# Patient Record
Sex: FEMALE | Race: WHITE | NOT HISPANIC OR LATINO | Employment: PART TIME | URBAN - METROPOLITAN AREA
[De-identification: names, ages, dates, MRNs, and addresses within clinical notes are randomized per-mention and may not be internally consistent; named-entity substitution may affect disease eponyms.]

---

## 2018-10-22 ENCOUNTER — APPOINTMENT (EMERGENCY)
Dept: RADIOLOGY | Facility: HOSPITAL | Age: 28
DRG: 223 | End: 2018-10-22
Payer: COMMERCIAL

## 2018-10-22 ENCOUNTER — ANESTHESIA (INPATIENT)
Dept: PERIOP | Facility: HOSPITAL | Age: 28
DRG: 223 | End: 2018-10-22
Payer: COMMERCIAL

## 2018-10-22 ENCOUNTER — ANESTHESIA EVENT (INPATIENT)
Dept: PERIOP | Facility: HOSPITAL | Age: 28
DRG: 223 | End: 2018-10-22
Payer: COMMERCIAL

## 2018-10-22 ENCOUNTER — APPOINTMENT (EMERGENCY)
Dept: CT IMAGING | Facility: HOSPITAL | Age: 28
DRG: 223 | End: 2018-10-22
Payer: COMMERCIAL

## 2018-10-22 ENCOUNTER — HOSPITAL ENCOUNTER (INPATIENT)
Facility: HOSPITAL | Age: 28
LOS: 3 days | Discharge: LEFT BEFORE DISCHARGE | DRG: 223 | End: 2018-10-25
Attending: EMERGENCY MEDICINE | Admitting: SURGERY
Payer: COMMERCIAL

## 2018-10-22 DIAGNOSIS — F19.10 IVDA (INTRAVENOUS DRUG ABUSE) COMPLICATING PREGNANCY (HCC): ICD-10-CM

## 2018-10-22 DIAGNOSIS — K66.8 PNEUMOPERITONEUM: Primary | ICD-10-CM

## 2018-10-22 DIAGNOSIS — F11.10 HEROIN ABUSE (HCC): ICD-10-CM

## 2018-10-22 DIAGNOSIS — O99.320 IVDA (INTRAVENOUS DRUG ABUSE) COMPLICATING PREGNANCY (HCC): ICD-10-CM

## 2018-10-22 PROBLEM — T74.91XA DOMESTIC ABUSE OF ADULT, INITIAL ENCOUNTER: Status: ACTIVE | Noted: 2018-10-22

## 2018-10-22 PROBLEM — K25.1: Status: ACTIVE | Noted: 2018-10-22

## 2018-10-22 PROBLEM — D72.823 LEUKEMOID REACTION: Status: ACTIVE | Noted: 2018-10-22

## 2018-10-22 PROBLEM — E87.1 HYPONATREMIA: Status: ACTIVE | Noted: 2018-10-22

## 2018-10-22 PROBLEM — J69.0 ASPIRATION PNEUMONIA (HCC): Status: ACTIVE | Noted: 2018-10-22

## 2018-10-22 LAB
ABO GROUP BLD: NORMAL
ALBUMIN SERPL BCP-MCNC: 3.9 G/DL (ref 3.5–5.7)
ALP SERPL-CCNC: 70 U/L (ref 40–150)
ALT SERPL W P-5'-P-CCNC: 35 U/L (ref 7–52)
AMPHETAMINES SERPL QL SCN: POSITIVE
ANION GAP SERPL CALCULATED.3IONS-SCNC: 8 MMOL/L (ref 4–13)
AST SERPL W P-5'-P-CCNC: 33 U/L (ref 13–39)
B-HCG SERPL-ACNC: <0.6 MIU/ML (ref 0–11.6)
BARBITURATES UR QL: NEGATIVE
BASOPHILS # BLD AUTO: 0 THOUSANDS/ΜL (ref 0–0.1)
BASOPHILS NFR BLD AUTO: 0 % (ref 0–2)
BENZODIAZ UR QL: NEGATIVE
BILIRUB SERPL-MCNC: 1.1 MG/DL (ref 0.2–1)
BLD GP AB SCN SERPL QL: NEGATIVE
BUN SERPL-MCNC: 12 MG/DL (ref 7–25)
CALCIUM SERPL-MCNC: 9.1 MG/DL (ref 8.6–10.5)
CHLORIDE SERPL-SCNC: 98 MMOL/L (ref 98–107)
CO2 SERPL-SCNC: 25 MMOL/L (ref 21–31)
COCAINE UR QL: NEGATIVE
CREAT SERPL-MCNC: 0.61 MG/DL (ref 0.6–1.2)
EOSINOPHIL # BLD AUTO: 0 THOUSAND/ΜL (ref 0–0.61)
EOSINOPHIL NFR BLD AUTO: 0 % (ref 0–5)
ERYTHROCYTE [DISTWIDTH] IN BLOOD BY AUTOMATED COUNT: 12.8 % (ref 11.5–14.5)
GFR SERPL CREATININE-BSD FRML MDRD: 125 ML/MIN/1.73SQ M
GLUCOSE SERPL-MCNC: 85 MG/DL (ref 65–99)
HCT VFR BLD AUTO: 39.5 % (ref 34.8–46.1)
HGB BLD-MCNC: 14 G/DL (ref 12–16)
LG PLATELETS BLD QL SMEAR: PRESENT
LYMPHOCYTES # BLD AUTO: 1.7 THOUSANDS/ΜL (ref 0.6–4.47)
LYMPHOCYTES NFR BLD AUTO: 14 % (ref 21–51)
MCH RBC QN AUTO: 33.1 PG (ref 26–34)
MCHC RBC AUTO-ENTMCNC: 35.4 G/DL (ref 31–37)
MCV RBC AUTO: 94 FL (ref 81–99)
METHADONE UR QL: NEGATIVE
MONOCYTES # BLD AUTO: 1 THOUSAND/ΜL (ref 0.17–1.22)
MONOCYTES NFR BLD AUTO: 8 % (ref 2–12)
NEUTROPHILS # BLD AUTO: 10 THOUSANDS/ΜL (ref 1.4–6.5)
NEUTS SEG NFR BLD AUTO: 78 % (ref 42–75)
NRBC BLD AUTO-RTO: 0 /100 WBCS
OPIATES UR QL SCN: NEGATIVE
PCP UR QL: NEGATIVE
PLATELET # BLD AUTO: 190 THOUSANDS/UL (ref 149–390)
PLATELET BLD QL SMEAR: ADEQUATE
PMV BLD AUTO: 9.2 FL (ref 8.6–11.7)
POTASSIUM SERPL-SCNC: 3.4 MMOL/L (ref 3.5–5.5)
PROT SERPL-MCNC: 6.9 G/DL (ref 6.4–8.9)
RBC # BLD AUTO: 4.22 MILLION/UL (ref 3.9–5.2)
RBC MORPH BLD: NORMAL
RH BLD: NEGATIVE
SODIUM SERPL-SCNC: 131 MMOL/L (ref 134–143)
SPECIMEN EXPIRATION DATE: NORMAL
THC UR QL: POSITIVE
WBC # BLD AUTO: 12.9 THOUSAND/UL (ref 4.8–10.8)

## 2018-10-22 PROCEDURE — 80053 COMPREHEN METABOLIC PANEL: CPT | Performed by: EMERGENCY MEDICINE

## 2018-10-22 PROCEDURE — C9113 INJ PANTOPRAZOLE SODIUM, VIA: HCPCS | Performed by: ANESTHESIOLOGY

## 2018-10-22 PROCEDURE — 43840 GSTRRPHY SUTR DUOL/GSTR ULCR: CPT | Performed by: PHYSICIAN ASSISTANT

## 2018-10-22 PROCEDURE — 86900 BLOOD TYPING SEROLOGIC ABO: CPT | Performed by: EMERGENCY MEDICINE

## 2018-10-22 PROCEDURE — 36415 COLL VENOUS BLD VENIPUNCTURE: CPT | Performed by: EMERGENCY MEDICINE

## 2018-10-22 PROCEDURE — 0DU947Z SUPPLEMENT DUODENUM WITH AUTOLOGOUS TISSUE SUBSTITUTE, PERCUTANEOUS ENDOSCOPIC APPROACH: ICD-10-PCS | Performed by: SURGERY

## 2018-10-22 PROCEDURE — 99223 1ST HOSP IP/OBS HIGH 75: CPT | Performed by: SURGERY

## 2018-10-22 PROCEDURE — 99285 EMERGENCY DEPT VISIT HI MDM: CPT

## 2018-10-22 PROCEDURE — 74022 RADEX COMPL AQT ABD SERIES: CPT

## 2018-10-22 PROCEDURE — 80307 DRUG TEST PRSMV CHEM ANLYZR: CPT | Performed by: SURGERY

## 2018-10-22 PROCEDURE — 86850 RBC ANTIBODY SCREEN: CPT | Performed by: EMERGENCY MEDICINE

## 2018-10-22 PROCEDURE — 96374 THER/PROPH/DIAG INJ IV PUSH: CPT

## 2018-10-22 PROCEDURE — C9113 INJ PANTOPRAZOLE SODIUM, VIA: HCPCS | Performed by: SURGERY

## 2018-10-22 PROCEDURE — 84702 CHORIONIC GONADOTROPIN TEST: CPT | Performed by: EMERGENCY MEDICINE

## 2018-10-22 PROCEDURE — 43840 GSTRRPHY SUTR DUOL/GSTR ULCR: CPT | Performed by: SURGERY

## 2018-10-22 PROCEDURE — 96372 THER/PROPH/DIAG INJ SC/IM: CPT

## 2018-10-22 PROCEDURE — 86901 BLOOD TYPING SEROLOGIC RH(D): CPT | Performed by: EMERGENCY MEDICINE

## 2018-10-22 PROCEDURE — 99252 IP/OBS CONSLTJ NEW/EST SF 35: CPT | Performed by: NURSE PRACTITIONER

## 2018-10-22 PROCEDURE — 85025 COMPLETE CBC W/AUTO DIFF WBC: CPT | Performed by: EMERGENCY MEDICINE

## 2018-10-22 PROCEDURE — 74176 CT ABD & PELVIS W/O CONTRAST: CPT

## 2018-10-22 RX ORDER — LIDOCAINE HYDROCHLORIDE 10 MG/ML
INJECTION, SOLUTION INFILTRATION; PERINEURAL AS NEEDED
Status: DISCONTINUED | OUTPATIENT
Start: 2018-10-22 | End: 2018-10-22 | Stop reason: SURG

## 2018-10-22 RX ORDER — ONDANSETRON 2 MG/ML
INJECTION INTRAMUSCULAR; INTRAVENOUS AS NEEDED
Status: DISCONTINUED | OUTPATIENT
Start: 2018-10-22 | End: 2018-10-22 | Stop reason: SURG

## 2018-10-22 RX ORDER — SODIUM CHLORIDE, SODIUM LACTATE, POTASSIUM CHLORIDE, CALCIUM CHLORIDE 600; 310; 30; 20 MG/100ML; MG/100ML; MG/100ML; MG/100ML
125 INJECTION, SOLUTION INTRAVENOUS CONTINUOUS
Status: DISCONTINUED | OUTPATIENT
Start: 2018-10-22 | End: 2018-10-23

## 2018-10-22 RX ORDER — ROCURONIUM BROMIDE 10 MG/ML
INJECTION, SOLUTION INTRAVENOUS AS NEEDED
Status: DISCONTINUED | OUTPATIENT
Start: 2018-10-22 | End: 2018-10-22 | Stop reason: SURG

## 2018-10-22 RX ORDER — TRAMADOL HYDROCHLORIDE 50 MG/1
50 TABLET ORAL ONCE
Status: DISCONTINUED | OUTPATIENT
Start: 2018-10-22 | End: 2018-10-22

## 2018-10-22 RX ORDER — MIDAZOLAM HYDROCHLORIDE 1 MG/ML
INJECTION INTRAMUSCULAR; INTRAVENOUS AS NEEDED
Status: DISCONTINUED | OUTPATIENT
Start: 2018-10-22 | End: 2018-10-22 | Stop reason: SURG

## 2018-10-22 RX ORDER — PANTOPRAZOLE SODIUM 40 MG/1
40 INJECTION, POWDER, FOR SOLUTION INTRAVENOUS ONCE
Status: COMPLETED | OUTPATIENT
Start: 2018-10-22 | End: 2018-10-22

## 2018-10-22 RX ORDER — HEPARIN SODIUM 5000 [USP'U]/ML
5000 INJECTION, SOLUTION INTRAVENOUS; SUBCUTANEOUS EVERY 8 HOURS SCHEDULED
Status: DISCONTINUED | OUTPATIENT
Start: 2018-10-22 | End: 2018-10-25 | Stop reason: HOSPADM

## 2018-10-22 RX ORDER — LORAZEPAM 2 MG/ML
1 INJECTION INTRAMUSCULAR ONCE
Status: COMPLETED | OUTPATIENT
Start: 2018-10-22 | End: 2018-10-22

## 2018-10-22 RX ORDER — LORAZEPAM 2 MG/ML
1 INJECTION INTRAMUSCULAR EVERY 4 HOURS PRN
Status: DISCONTINUED | OUTPATIENT
Start: 2018-10-22 | End: 2018-10-25 | Stop reason: HOSPADM

## 2018-10-22 RX ORDER — PROPOFOL 10 MG/ML
INJECTION, EMULSION INTRAVENOUS AS NEEDED
Status: DISCONTINUED | OUTPATIENT
Start: 2018-10-22 | End: 2018-10-22 | Stop reason: SURG

## 2018-10-22 RX ORDER — MAGNESIUM HYDROXIDE 1200 MG/15ML
LIQUID ORAL AS NEEDED
Status: DISCONTINUED | OUTPATIENT
Start: 2018-10-22 | End: 2018-10-22 | Stop reason: HOSPADM

## 2018-10-22 RX ORDER — BUPIVACAINE HYDROCHLORIDE 5 MG/ML
INJECTION, SOLUTION PERINEURAL AS NEEDED
Status: DISCONTINUED | OUTPATIENT
Start: 2018-10-22 | End: 2018-10-22 | Stop reason: HOSPADM

## 2018-10-22 RX ORDER — SODIUM CHLORIDE 9 MG/ML
150 INJECTION, SOLUTION INTRAVENOUS CONTINUOUS
Status: DISCONTINUED | OUTPATIENT
Start: 2018-10-22 | End: 2018-10-22

## 2018-10-22 RX ORDER — SODIUM CHLORIDE, SODIUM LACTATE, POTASSIUM CHLORIDE, CALCIUM CHLORIDE 600; 310; 30; 20 MG/100ML; MG/100ML; MG/100ML; MG/100ML
INJECTION, SOLUTION INTRAVENOUS CONTINUOUS PRN
Status: DISCONTINUED | OUTPATIENT
Start: 2018-10-22 | End: 2018-10-22 | Stop reason: SURG

## 2018-10-22 RX ORDER — HYDROMORPHONE HCL/PF 1 MG/ML
0.5 SYRINGE (ML) INJECTION
Status: DISCONTINUED | OUTPATIENT
Start: 2018-10-22 | End: 2018-10-22 | Stop reason: HOSPADM

## 2018-10-22 RX ORDER — ONDANSETRON 2 MG/ML
4 INJECTION INTRAMUSCULAR; INTRAVENOUS EVERY 4 HOURS PRN
Status: DISCONTINUED | OUTPATIENT
Start: 2018-10-22 | End: 2018-10-25 | Stop reason: HOSPADM

## 2018-10-22 RX ORDER — SODIUM CHLORIDE 9 MG/ML
INJECTION, SOLUTION INTRAVENOUS AS NEEDED
Status: DISCONTINUED | OUTPATIENT
Start: 2018-10-22 | End: 2018-10-22 | Stop reason: HOSPADM

## 2018-10-22 RX ORDER — PANTOPRAZOLE SODIUM 40 MG/1
40 INJECTION, POWDER, FOR SOLUTION INTRAVENOUS EVERY 12 HOURS SCHEDULED
Status: DISCONTINUED | OUTPATIENT
Start: 2018-10-22 | End: 2018-10-25 | Stop reason: HOSPADM

## 2018-10-22 RX ORDER — KETOROLAC TROMETHAMINE 30 MG/ML
15 INJECTION, SOLUTION INTRAMUSCULAR; INTRAVENOUS EVERY 6 HOURS SCHEDULED
Status: DISCONTINUED | OUTPATIENT
Start: 2018-10-22 | End: 2018-10-25 | Stop reason: HOSPADM

## 2018-10-22 RX ORDER — HALOPERIDOL 5 MG/ML
10 INJECTION INTRAMUSCULAR ONCE
Status: COMPLETED | OUTPATIENT
Start: 2018-10-22 | End: 2018-10-22

## 2018-10-22 RX ORDER — HYDROMORPHONE HYDROCHLORIDE 2 MG/ML
INJECTION, SOLUTION INTRAMUSCULAR; INTRAVENOUS; SUBCUTANEOUS AS NEEDED
Status: DISCONTINUED | OUTPATIENT
Start: 2018-10-22 | End: 2018-10-22 | Stop reason: SURG

## 2018-10-22 RX ORDER — FENTANYL CITRATE 50 UG/ML
INJECTION, SOLUTION INTRAMUSCULAR; INTRAVENOUS AS NEEDED
Status: DISCONTINUED | OUTPATIENT
Start: 2018-10-22 | End: 2018-10-22 | Stop reason: SURG

## 2018-10-22 RX ADMIN — HYDROMORPHONE HYDROCHLORIDE 0.5 MG: 2 INJECTION, SOLUTION INTRAMUSCULAR; INTRAVENOUS; SUBCUTANEOUS at 08:40

## 2018-10-22 RX ADMIN — PANTOPRAZOLE SODIUM 40 MG: 40 INJECTION, POWDER, FOR SOLUTION INTRAVENOUS at 20:39

## 2018-10-22 RX ADMIN — HYDROMORPHONE HYDROCHLORIDE 0.5 MG: 2 INJECTION, SOLUTION INTRAMUSCULAR; INTRAVENOUS; SUBCUTANEOUS at 08:55

## 2018-10-22 RX ADMIN — KETOROLAC TROMETHAMINE 15 MG: 30 INJECTION, SOLUTION INTRAMUSCULAR at 12:31

## 2018-10-22 RX ADMIN — Medication 3.38 G: at 23:16

## 2018-10-22 RX ADMIN — SODIUM CHLORIDE, SODIUM LACTATE, POTASSIUM CHLORIDE, AND CALCIUM CHLORIDE: .6; .31; .03; .02 INJECTION, SOLUTION INTRAVENOUS at 09:25

## 2018-10-22 RX ADMIN — ROCURONIUM BROMIDE 50 MG: 10 INJECTION INTRAVENOUS at 06:20

## 2018-10-22 RX ADMIN — PANTOPRAZOLE SODIUM 40 MG: 40 INJECTION, POWDER, FOR SOLUTION INTRAVENOUS at 12:30

## 2018-10-22 RX ADMIN — KETOROLAC TROMETHAMINE: 30 INJECTION, SOLUTION INTRAMUSCULAR at 17:44

## 2018-10-22 RX ADMIN — HEPARIN SODIUM 5000 UNITS: 5000 INJECTION INTRAVENOUS; SUBCUTANEOUS at 20:43

## 2018-10-22 RX ADMIN — SODIUM CHLORIDE: 0.9 INJECTION, SOLUTION INTRAVENOUS at 07:10

## 2018-10-22 RX ADMIN — LORAZEPAM 1 MG: 2 INJECTION INTRAMUSCULAR; INTRAVENOUS at 20:35

## 2018-10-22 RX ADMIN — LIDOCAINE HYDROCHLORIDE 100 MG: 10 INJECTION, SOLUTION INFILTRATION; PERINEURAL at 06:20

## 2018-10-22 RX ADMIN — SODIUM CHLORIDE, POTASSIUM CHLORIDE, SODIUM LACTATE AND CALCIUM CHLORIDE 125 ML/HR: 600; 310; 30; 20 INJECTION, SOLUTION INTRAVENOUS at 19:45

## 2018-10-22 RX ADMIN — MIDAZOLAM HYDROCHLORIDE 2 MG: 1 INJECTION, SOLUTION INTRAMUSCULAR; INTRAVENOUS at 06:20

## 2018-10-22 RX ADMIN — SODIUM CHLORIDE, SODIUM LACTATE, POTASSIUM CHLORIDE, AND CALCIUM CHLORIDE: .6; .31; .03; .02 INJECTION, SOLUTION INTRAVENOUS at 08:23

## 2018-10-22 RX ADMIN — PANTOPRAZOLE SODIUM 40 MG: 40 INJECTION, POWDER, FOR SOLUTION INTRAVENOUS at 08:21

## 2018-10-22 RX ADMIN — FENTANYL CITRATE 50 MCG: 50 INJECTION INTRAMUSCULAR; INTRAVENOUS at 06:25

## 2018-10-22 RX ADMIN — ONDANSETRON HYDROCHLORIDE 8 MG: 2 INJECTION, SOLUTION INTRAVENOUS at 07:00

## 2018-10-22 RX ADMIN — HALOPERIDOL LACTATE 10 MG: 5 INJECTION INTRAMUSCULAR at 01:03

## 2018-10-22 RX ADMIN — PROPOFOL 200 MG: 10 INJECTION, EMULSION INTRAVENOUS at 06:20

## 2018-10-22 RX ADMIN — SODIUM CHLORIDE 150 ML/HR: 0.9 INJECTION, SOLUTION INTRAVENOUS at 04:45

## 2018-10-22 RX ADMIN — SODIUM CHLORIDE, POTASSIUM CHLORIDE, SODIUM LACTATE AND CALCIUM CHLORIDE 125 ML/HR: 600; 310; 30; 20 INJECTION, SOLUTION INTRAVENOUS at 10:57

## 2018-10-22 RX ADMIN — FENTANYL CITRATE 50 MCG: 50 INJECTION INTRAMUSCULAR; INTRAVENOUS at 06:30

## 2018-10-22 RX ADMIN — SODIUM CHLORIDE: 0.9 INJECTION, SOLUTION INTRAVENOUS at 06:35

## 2018-10-22 RX ADMIN — ROCURONIUM BROMIDE 10 MG: 10 INJECTION INTRAVENOUS at 08:20

## 2018-10-22 RX ADMIN — DEXAMETHASONE SODIUM PHOSPHATE 10 MG: 10 INJECTION INTRAMUSCULAR; INTRAVENOUS at 07:00

## 2018-10-22 RX ADMIN — SUGAMMADEX 200 MG: 100 INJECTION, SOLUTION INTRAVENOUS at 09:10

## 2018-10-22 RX ADMIN — LORAZEPAM 1 MG: 2 INJECTION INTRAMUSCULAR; INTRAVENOUS at 01:42

## 2018-10-22 RX ADMIN — SODIUM CHLORIDE: 0.9 INJECTION, SOLUTION INTRAVENOUS at 05:50

## 2018-10-22 RX ADMIN — Medication 3.38 G: at 05:38

## 2018-10-22 RX ADMIN — Medication 3.38 G: at 16:40

## 2018-10-22 NOTE — ANESTHESIA POSTPROCEDURE EVALUATION
Post-Op Assessment Note      Mental Status:  Somnolent    Hydration Status:  Stable    PONV Controlled:  None    Airway Patency:  Patent    Post Op Vitals Reviewed: Yes          Staff: Anesthesiologist           BP   123/62   Temp   96 0   Pulse  70   Resp  22   SpO2   100

## 2018-10-22 NOTE — ASSESSMENT & PLAN NOTE
· Evidence of CT a/p   · Patient is getting zosyn  Will continue with that     · Will encourage early ambulation, IS
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
## 2018-10-22 NOTE — ED NOTES
Patient opens eyes to voice, mumbled incoherently then back to sleep  Remains in NSR on monitor HR 90  Respirations non-labored on room air, SaO2 97% on same  /85  Removed nose ring at this time  Unable to remove tongue ring, patient attempting to bite nurse when trying to remove it  Will make OR aware of same       Nitin Kelly RN  10/22/18 8799
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
## 2018-10-22 NOTE — ANESTHESIA PREPROCEDURE EVALUATION
Review of Systems/Medical History    Chart reviewed      Cardiovascular   Pulmonary       GI/Hepatic      Comment: Free air per CT          Endo/Other     GYN       Hematology   Musculoskeletal       Neurology   Psychology   Psychiatric history,   Chronic opioid dependence            Physical Exam    Airway    Mallampati score: IV         Dental       Cardiovascular  Rhythm: regular, Rate: normal,     Pulmonary  Breath sounds clear to auscultation,     Other Findings  Tongue ring in      Anesthesia Plan  ASA Score- 3 Emergent    Anesthesia Type- general with ASA Monitors  Additional Monitors:   Airway Plan:     Comment: pateint incoherent after haldol no consent obtained  Deemed emergency per surgeon        Plan Factors-    Induction- rapid sequence induction and intravenous  Postoperative Plan- Plan for postoperative opioid use  Informed Consent- Anesthetic plan and risks discussed with patient
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
## 2018-10-22 NOTE — ED NOTES
Patient calmed at this time, fell asleep  Will continue to monitor       Denzil Holter, UMESH  10/22/18 1796

## 2018-10-22 NOTE — RESPIRATORY THERAPY NOTE
Attempted to initiate Respiratory Protocol several times since initial order, but Pt  is very sleepy  Will initiate Protocol when Pt  is awake, alert and oriented
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
## 2018-10-22 NOTE — ASSESSMENT & PLAN NOTE
· Likely secondary to perforation and possible aspiration  · Will continue with zosyn   · Follow up with labs in am

## 2018-10-22 NOTE — ASSESSMENT & PLAN NOTE
· Patient is unable to provide useful information   · Will re-assess when she is more awake and cooperative
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
## 2018-10-22 NOTE — ASSESSMENT & PLAN NOTE
· Underwent a laparoscopic and repair of a large posterior gastric perforation on the lesser curvature of the stomach on 10/22/2018  · Will continue treatment per primary team  · Continue with NGT, NPO, zosyn, IV PPI   · Pain management    · Monitor labs

## 2018-10-22 NOTE — ED NOTES
Patient extremely agitated at this time, yelling at staff when attempting to start an IV on patient  Screaming at me "Take the needle out it doesn't hurt this bad when I shoot up! Take it out or I'll rip the damn needle out myself "  MD aware will order IM injection, as patient extremely agitated and unable to calm patient       Jett Monaco RN  10/22/18 1149

## 2018-10-23 PROBLEM — R45.1 AGITATION: Status: ACTIVE | Noted: 2018-10-23

## 2018-10-23 PROBLEM — A41.9 SEPSIS (HCC): Status: ACTIVE | Noted: 2018-10-23

## 2018-10-23 LAB
ALBUMIN SERPL BCP-MCNC: 2.8 G/DL (ref 3.5–5.7)
ALP SERPL-CCNC: 52 U/L (ref 40–150)
ALT SERPL W P-5'-P-CCNC: 29 U/L (ref 7–52)
ANION GAP SERPL CALCULATED.3IONS-SCNC: 8 MMOL/L (ref 4–13)
AST SERPL W P-5'-P-CCNC: 27 U/L (ref 13–39)
BASOPHILS # BLD AUTO: 0 THOUSANDS/ΜL (ref 0–0.1)
BASOPHILS NFR BLD AUTO: 0 % (ref 0–2)
BILIRUB SERPL-MCNC: 0.7 MG/DL (ref 0.2–1)
BUN SERPL-MCNC: 12 MG/DL (ref 7–25)
CALCIUM SERPL-MCNC: 8.2 MG/DL (ref 8.6–10.5)
CHLORIDE SERPL-SCNC: 104 MMOL/L (ref 98–107)
CO2 SERPL-SCNC: 23 MMOL/L (ref 21–31)
CREAT SERPL-MCNC: 0.52 MG/DL (ref 0.6–1.2)
EOSINOPHIL # BLD AUTO: 0 THOUSAND/ΜL (ref 0–0.61)
EOSINOPHIL NFR BLD AUTO: 0 % (ref 0–5)
ERYTHROCYTE [DISTWIDTH] IN BLOOD BY AUTOMATED COUNT: 13.5 % (ref 11.5–14.5)
GFR SERPL CREATININE-BSD FRML MDRD: 131 ML/MIN/1.73SQ M
GLUCOSE SERPL-MCNC: 77 MG/DL (ref 65–99)
HCT VFR BLD AUTO: 33.6 % (ref 34.8–46.1)
HGB BLD-MCNC: 11.5 G/DL (ref 12–16)
LYMPHOCYTES # BLD AUTO: 1.7 THOUSANDS/ΜL (ref 0.6–4.47)
LYMPHOCYTES NFR BLD AUTO: 14 % (ref 21–51)
MAGNESIUM SERPL-MCNC: 1.9 MG/DL (ref 1.9–2.7)
MCH RBC QN AUTO: 32.7 PG (ref 26–34)
MCHC RBC AUTO-ENTMCNC: 34.3 G/DL (ref 31–37)
MCV RBC AUTO: 96 FL (ref 81–99)
MONOCYTES # BLD AUTO: 0.8 THOUSAND/ΜL (ref 0.17–1.22)
MONOCYTES NFR BLD AUTO: 7 % (ref 2–12)
NEUTROPHILS # BLD AUTO: 9.6 THOUSANDS/ΜL (ref 1.4–6.5)
NEUTS SEG NFR BLD AUTO: 79 % (ref 42–75)
NRBC BLD AUTO-RTO: 0 /100 WBCS
PHOSPHATE SERPL-MCNC: 2.1 MG/DL (ref 3–5.5)
PLATELET # BLD AUTO: 167 THOUSANDS/UL (ref 149–390)
PMV BLD AUTO: 8.6 FL (ref 8.6–11.7)
POTASSIUM SERPL-SCNC: 3.5 MMOL/L (ref 3.5–5.5)
PROT SERPL-MCNC: 5.4 G/DL (ref 6.4–8.9)
RBC # BLD AUTO: 3.52 MILLION/UL (ref 3.9–5.2)
SODIUM SERPL-SCNC: 135 MMOL/L (ref 134–143)
WBC # BLD AUTO: 12.1 THOUSAND/UL (ref 4.8–10.8)

## 2018-10-23 PROCEDURE — 99232 SBSQ HOSP IP/OBS MODERATE 35: CPT | Performed by: SURGERY

## 2018-10-23 PROCEDURE — C9113 INJ PANTOPRAZOLE SODIUM, VIA: HCPCS | Performed by: SURGERY

## 2018-10-23 PROCEDURE — 84100 ASSAY OF PHOSPHORUS: CPT | Performed by: SURGERY

## 2018-10-23 PROCEDURE — 83735 ASSAY OF MAGNESIUM: CPT | Performed by: SURGERY

## 2018-10-23 PROCEDURE — 80053 COMPREHEN METABOLIC PANEL: CPT | Performed by: SURGERY

## 2018-10-23 PROCEDURE — 85025 COMPLETE CBC W/AUTO DIFF WBC: CPT | Performed by: SURGERY

## 2018-10-23 PROCEDURE — 99024 POSTOP FOLLOW-UP VISIT: CPT | Performed by: PHYSICIAN ASSISTANT

## 2018-10-23 RX ORDER — DEXTROSE, SODIUM CHLORIDE, AND POTASSIUM CHLORIDE 5; .45; .15 G/100ML; G/100ML; G/100ML
100 INJECTION INTRAVENOUS CONTINUOUS
Status: DISCONTINUED | OUTPATIENT
Start: 2018-10-23 | End: 2018-10-24

## 2018-10-23 RX ADMIN — LORAZEPAM 1 MG: 2 INJECTION INTRAMUSCULAR; INTRAVENOUS at 22:41

## 2018-10-23 RX ADMIN — LORAZEPAM 1 MG: 2 INJECTION INTRAMUSCULAR; INTRAVENOUS at 18:21

## 2018-10-23 RX ADMIN — KETOROLAC TROMETHAMINE 15 MG: 30 INJECTION, SOLUTION INTRAMUSCULAR at 05:25

## 2018-10-23 RX ADMIN — HEPARIN SODIUM 5000 UNITS: 5000 INJECTION INTRAVENOUS; SUBCUTANEOUS at 13:31

## 2018-10-23 RX ADMIN — LORAZEPAM 1 MG: 2 INJECTION INTRAMUSCULAR; INTRAVENOUS at 10:17

## 2018-10-23 RX ADMIN — Medication 3.38 G: at 11:13

## 2018-10-23 RX ADMIN — Medication 1 SPRAY: at 21:39

## 2018-10-23 RX ADMIN — HEPARIN SODIUM 5000 UNITS: 5000 INJECTION INTRAVENOUS; SUBCUTANEOUS at 05:25

## 2018-10-23 RX ADMIN — KETOROLAC TROMETHAMINE 15 MG: 30 INJECTION, SOLUTION INTRAMUSCULAR at 00:06

## 2018-10-23 RX ADMIN — Medication 3.38 G: at 16:58

## 2018-10-23 RX ADMIN — SODIUM CHLORIDE, POTASSIUM CHLORIDE, SODIUM LACTATE AND CALCIUM CHLORIDE 125 ML/HR: 600; 310; 30; 20 INJECTION, SOLUTION INTRAVENOUS at 04:22

## 2018-10-23 RX ADMIN — PANTOPRAZOLE SODIUM 40 MG: 40 INJECTION, POWDER, FOR SOLUTION INTRAVENOUS at 10:17

## 2018-10-23 RX ADMIN — Medication 3.38 G: at 22:49

## 2018-10-23 RX ADMIN — Medication 3.38 G: at 05:25

## 2018-10-23 RX ADMIN — ONDANSETRON 4 MG: 2 INJECTION, SOLUTION INTRAMUSCULAR; INTRAVENOUS at 14:55

## 2018-10-23 RX ADMIN — KETOROLAC TROMETHAMINE 15 MG: 30 INJECTION, SOLUTION INTRAMUSCULAR at 23:44

## 2018-10-23 RX ADMIN — SODIUM CHLORIDE, POTASSIUM CHLORIDE, SODIUM LACTATE AND CALCIUM CHLORIDE 125 ML/HR: 600; 310; 30; 20 INJECTION, SOLUTION INTRAVENOUS at 13:27

## 2018-10-23 RX ADMIN — PANTOPRAZOLE SODIUM 40 MG: 40 INJECTION, POWDER, FOR SOLUTION INTRAVENOUS at 21:51

## 2018-10-23 RX ADMIN — POTASSIUM CHLORIDE, DEXTROSE MONOHYDRATE AND SODIUM CHLORIDE 100 ML/HR: 150; 5; 450 INJECTION, SOLUTION INTRAVENOUS at 18:01

## 2018-10-23 RX ADMIN — HEPARIN SODIUM 5000 UNITS: 5000 INJECTION INTRAVENOUS; SUBCUTANEOUS at 21:51

## 2018-10-23 RX ADMIN — Medication 1 SPRAY: at 16:58

## 2018-10-23 RX ADMIN — POTASSIUM PHOSPHATE, MONOBASIC AND POTASSIUM PHOSPHATE, DIBASIC 30 MMOL: 224; 236 INJECTION, SOLUTION, CONCENTRATE INTRAVENOUS at 12:28

## 2018-10-23 RX ADMIN — KETOROLAC TROMETHAMINE: 30 INJECTION, SOLUTION INTRAMUSCULAR at 17:05

## 2018-10-23 RX ADMIN — KETOROLAC TROMETHAMINE: 30 INJECTION, SOLUTION INTRAMUSCULAR at 12:28

## 2018-10-23 RX ADMIN — LORAZEPAM 1 MG: 2 INJECTION INTRAMUSCULAR; INTRAVENOUS at 14:50

## 2018-10-23 NOTE — NURSING NOTE
Patient awake at intervals throughout the day  Patient threatens to pull NGT and leave the hospital , yells obscenities, agitated and anxious when awake  Patient reassured, explanations for surgery, for all care, for NGT, reviewed with patient  Ativan given as ordered for anxiety and agitation  Patient resting at intervals  One to one constant watch maintained  Dr's aware of patient's behavior, and that she is maintained on bedrest;  deep breathing and coughing with encouragement  Venodynes maintained
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
## 2018-10-23 NOTE — PROGRESS NOTES
Progress Note - [place] [birth year], 33 y o  female MRN: [id number]    Unit/Bed#: ICU 03 Encounter: [id number]    Primary Care Provider: No primary care provider on file  Date and time admitted to hospital: 10/22/2018 12:49 AM        Sepsis [facility])   Assessment & Plan    Altered mental status, tachycardia, leukocytosis  Clinically improved in 12-24 hours  Source is perforated gastric ulcer with gross peritoneal contamination and likely aspiration  Continue IV fluids and IV antibiotics (day#2 Zosyn)  Blood cultures not previously drawn, will draw if develops fever  Agitation   Assessment & Plan    Likely multifactorial: acute illness, major abdominal surgery, substance abuse (THC/amphetamines on UDS), possible underlying psychiatric disorder  Psych consult pending  Continue ativan prn  Attempt to maintain patient orientation to place and time  Aspiration pneumonia [facility])   Assessment & Plan    Finding on admission CT  Coverage on Zosyn currently day #2  Not currently hypoxemic or in respiratory distress  Will check formal PA/Lateral at time of upper GI series on 10/26/2018  Continue IS and ambulation when possible  * Gastric ulcer with perforation but without obstruction, acute [facility])   Assessment & Plan    The patient is hemodynamically stable POD#1 s/p diagnostic laparoscopy and repair of perforated gastric ulcer  Will continue ICU monitoring, providing pain control, and supportive measures  Must remain NPO with NGT for next few days  High dose PPI ordered  Zosyn day #2 for peritonitis/peritoneal contamination/questionable aspiration pneumonia  Will check chest x-ray and upper GI series on 10/26/2018 or sooner as needed  Subjective/Objective   Chief Complaint: Patient sedated  Subjective: Patient sedated due to agitation  Arouseable, but not answering questions  Continued threats of removing NG tube/IV lines      Objective:     Blood pressure 117/83, pulse 88, temperature 98 8 °F (37 1 °C), temperature source Temporal, resp  rate 16, height 5' 5" (1 651 m), weight 63 5 kg (140 lb), SpO2 96 %  ,Body mass index is 23 3 kg/m²  Intake/Output Summary (Last 24 hours) at 10/23/18 1703  Last data filed at 10/23/18 1601   Gross per 24 hour   Intake          3150 83 ml   Output             1913 ml   Net          1237 83 ml       Invasive Devices     Peripheral Intravenous Line            Peripheral IV 10/22/18 Left Forearm 1 day    Peripheral IV 10/22/18 Right Arm 1 day          Drain            Closed/Suction Drain Left Abdomen Bulb 19 Fr  1 day    NG/OG/Enteral Tube Nasogastric 18 Fr Right nares 1 day    Urethral Catheter Latex 16 Fr  1 day          Airway            ETT  Cuffed;Oral 7 5 mm 1 day                Physical Exam   Constitutional: She appears well-developed and well-nourished  She appears lethargic  No distress  HENT:   Head: Normocephalic and atraumatic  Neck: Normal range of motion  Cardiovascular: Normal rate and regular rhythm  No murmur heard  Pulmonary/Chest: Effort normal and breath sounds normal  No respiratory distress  Abdominal: Soft  She exhibits no distension  There is tenderness  Exam limited by patient non-compliance  Musculoskeletal: Normal range of motion  Neurological: She appears lethargic  Skin: Skin is warm and dry  Capillary refill takes less than 2 seconds  She is not diaphoretic  Psychiatric: Her affect is angry  She is agitated  Nursing note and vitals reviewed  Lab, Imaging and other studies:  I have personally reviewed pertinent lab results    , CBC:   Lab Results   Component Value Date    WBC 12 10 (H) 10/23/2018    HGB 11 5 (L) 10/23/2018    HCT 33 6 (L) 10/23/2018    MCV 96 10/23/2018     10/23/2018    MCH 32 7 10/23/2018    MCHC 34 3 10/23/2018    RDW 13 5 10/23/2018    MPV 8 6 10/23/2018    NRBC 0 10/23/2018   , CMP:   Lab Results   Component Value Date     10/23/2018    K 3 5 10/23/2018     10/23/2018    CO2 23 10/23/2018    BUN 12 10/23/2018    CREATININE 0 52 (L) 10/23/2018    CALCIUM 8 2 (L) 10/23/2018    AST 27 10/23/2018    ALT 29 10/23/2018    ALKPHOS 52 10/23/2018    EGFR 131 10/23/2018     VTE Pharmacologic Prophylaxis: Heparin  VTE Mechanical Prophylaxis: sequential compression device

## 2018-10-23 NOTE — NURSING NOTE
Patient moving about in bed  Earlier patient was uncooperative and was threatening to pull out NGT and remove other medical devises  Yelling profanities at initial assessment  Patient was assessed without disturbing her from sleep to assess  Patient appears comfortable and is in no acute distress  Continuous observation remains in place and being performed by Chris Sun CNA
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
## 2018-10-23 NOTE — SOCIAL WORK
Attempted to evaluated the pt at the bedside x2  Pt has a 1:1 at the bedside  Unable to arouse the pt to ask any questions  Pt would not open her eyes or talk  No emergency contact noted  Will re-evaluate when pt is awake  [place]

## 2018-10-23 NOTE — ASSESSMENT & PLAN NOTE
Altered mental status, tachycardia, leukocytosis  Clinically improved in 12-24 hours  Source is perforated gastric ulcer with gross peritoneal contamination and likely aspiration  Continue IV fluids and IV antibiotics (day#2 Zosyn)  Blood cultures not previously drawn, will draw if develops fever

## 2018-10-23 NOTE — ASSESSMENT & PLAN NOTE
Finding on admission CT  Coverage on Zosyn currently day #2  Not currently hypoxemic or in respiratory distress  Will check formal PA/Lateral at time of upper GI series on 10/26/2018  Continue IS and ambulation when possible

## 2018-10-24 ENCOUNTER — APPOINTMENT (INPATIENT)
Dept: RADIOLOGY | Facility: HOSPITAL | Age: 28
DRG: 223 | End: 2018-10-24
Payer: COMMERCIAL

## 2018-10-24 LAB
ANION GAP SERPL CALCULATED.3IONS-SCNC: 6 MMOL/L (ref 4–13)
BASOPHILS # BLD AUTO: 0 THOUSANDS/ΜL (ref 0–0.1)
BASOPHILS NFR BLD AUTO: 1 % (ref 0–2)
BUN SERPL-MCNC: 6 MG/DL (ref 7–25)
CALCIUM SERPL-MCNC: 8.6 MG/DL (ref 8.6–10.5)
CHLORIDE SERPL-SCNC: 103 MMOL/L (ref 98–107)
CO2 SERPL-SCNC: 26 MMOL/L (ref 21–31)
CREAT SERPL-MCNC: 0.58 MG/DL (ref 0.6–1.2)
EOSINOPHIL # BLD AUTO: 0.1 THOUSAND/ΜL (ref 0–0.61)
EOSINOPHIL NFR BLD AUTO: 1 % (ref 0–5)
ERYTHROCYTE [DISTWIDTH] IN BLOOD BY AUTOMATED COUNT: 13 % (ref 11.5–14.5)
GFR SERPL CREATININE-BSD FRML MDRD: 127 ML/MIN/1.73SQ M
GLUCOSE SERPL-MCNC: 95 MG/DL (ref 65–99)
HCT VFR BLD AUTO: 36.7 % (ref 34.8–46.1)
HGB BLD-MCNC: 12.5 G/DL (ref 12–16)
LYMPHOCYTES # BLD AUTO: 2.3 THOUSANDS/ΜL (ref 0.6–4.47)
LYMPHOCYTES NFR BLD AUTO: 31 % (ref 21–51)
MAGNESIUM SERPL-MCNC: 2.1 MG/DL (ref 1.9–2.7)
MCH RBC QN AUTO: 32.4 PG (ref 26–34)
MCHC RBC AUTO-ENTMCNC: 34 G/DL (ref 31–37)
MCV RBC AUTO: 96 FL (ref 81–99)
MONOCYTES # BLD AUTO: 0.6 THOUSAND/ΜL (ref 0.17–1.22)
MONOCYTES NFR BLD AUTO: 9 % (ref 2–12)
NEUTROPHILS # BLD AUTO: 4.4 THOUSANDS/ΜL (ref 1.4–6.5)
NEUTS SEG NFR BLD AUTO: 59 % (ref 42–75)
NRBC BLD AUTO-RTO: 0 /100 WBCS
PHOSPHATE SERPL-MCNC: 2.1 MG/DL (ref 3–5.5)
PLATELET # BLD AUTO: 199 THOUSANDS/UL (ref 149–390)
PMV BLD AUTO: 8.6 FL (ref 8.6–11.7)
POTASSIUM SERPL-SCNC: 3.6 MMOL/L (ref 3.5–5.5)
RBC # BLD AUTO: 3.85 MILLION/UL (ref 3.9–5.2)
SODIUM SERPL-SCNC: 135 MMOL/L (ref 134–143)
WBC # BLD AUTO: 7.4 THOUSAND/UL (ref 4.8–10.8)

## 2018-10-24 PROCEDURE — 71045 X-RAY EXAM CHEST 1 VIEW: CPT

## 2018-10-24 PROCEDURE — 84100 ASSAY OF PHOSPHORUS: CPT | Performed by: PHYSICIAN ASSISTANT

## 2018-10-24 PROCEDURE — C9113 INJ PANTOPRAZOLE SODIUM, VIA: HCPCS | Performed by: SURGERY

## 2018-10-24 PROCEDURE — 99232 SBSQ HOSP IP/OBS MODERATE 35: CPT | Performed by: PHYSICIAN ASSISTANT

## 2018-10-24 PROCEDURE — 83735 ASSAY OF MAGNESIUM: CPT | Performed by: PHYSICIAN ASSISTANT

## 2018-10-24 PROCEDURE — 80048 BASIC METABOLIC PNL TOTAL CA: CPT | Performed by: PHYSICIAN ASSISTANT

## 2018-10-24 PROCEDURE — 85025 COMPLETE CBC W/AUTO DIFF WBC: CPT | Performed by: PHYSICIAN ASSISTANT

## 2018-10-24 PROCEDURE — 99232 SBSQ HOSP IP/OBS MODERATE 35: CPT | Performed by: HOSPITALIST

## 2018-10-24 RX ORDER — SODIUM CHLORIDE 9 MG/ML
75 INJECTION, SOLUTION INTRAVENOUS CONTINUOUS
Status: DISCONTINUED | OUTPATIENT
Start: 2018-10-24 | End: 2018-10-25 | Stop reason: HOSPADM

## 2018-10-24 RX ORDER — HYDROMORPHONE HCL/PF 1 MG/ML
1 SYRINGE (ML) INJECTION
Status: DISCONTINUED | OUTPATIENT
Start: 2018-10-24 | End: 2018-10-25 | Stop reason: HOSPADM

## 2018-10-24 RX ADMIN — LORAZEPAM 1 MG: 2 INJECTION INTRAMUSCULAR; INTRAVENOUS at 13:03

## 2018-10-24 RX ADMIN — HEPARIN SODIUM 5000 UNITS: 5000 INJECTION INTRAVENOUS; SUBCUTANEOUS at 13:42

## 2018-10-24 RX ADMIN — Medication 3.38 G: at 05:42

## 2018-10-24 RX ADMIN — LORAZEPAM 1 MG: 2 INJECTION INTRAMUSCULAR; INTRAVENOUS at 09:24

## 2018-10-24 RX ADMIN — KETOROLAC TROMETHAMINE 15 MG: 30 INJECTION, SOLUTION INTRAMUSCULAR at 13:03

## 2018-10-24 RX ADMIN — SODIUM CHLORIDE 75 ML/HR: 900 INJECTION INTRAVENOUS at 10:30

## 2018-10-24 RX ADMIN — Medication 3.38 G: at 13:38

## 2018-10-24 RX ADMIN — POTASSIUM PHOSPHATE, MONOBASIC AND POTASSIUM PHOSPHATE, DIBASIC 30 MMOL: 224; 236 INJECTION, SOLUTION, CONCENTRATE INTRAVENOUS at 09:23

## 2018-10-24 RX ADMIN — PANTOPRAZOLE SODIUM 40 MG: 40 INJECTION, POWDER, FOR SOLUTION INTRAVENOUS at 20:25

## 2018-10-24 RX ADMIN — LORAZEPAM 1 MG: 2 INJECTION INTRAMUSCULAR; INTRAVENOUS at 17:26

## 2018-10-24 RX ADMIN — KETOROLAC TROMETHAMINE 15 MG: 30 INJECTION, SOLUTION INTRAMUSCULAR at 17:27

## 2018-10-24 RX ADMIN — POTASSIUM CHLORIDE, DEXTROSE MONOHYDRATE AND SODIUM CHLORIDE 100 ML/HR: 150; 5; 450 INJECTION, SOLUTION INTRAVENOUS at 04:43

## 2018-10-24 RX ADMIN — HYDROMORPHONE HYDROCHLORIDE 1 MG: 1 INJECTION, SOLUTION INTRAMUSCULAR; INTRAVENOUS; SUBCUTANEOUS at 22:25

## 2018-10-24 RX ADMIN — KETOROLAC TROMETHAMINE 15 MG: 30 INJECTION, SOLUTION INTRAMUSCULAR at 05:43

## 2018-10-24 RX ADMIN — Medication 3.38 G: at 17:28

## 2018-10-24 RX ADMIN — HEPARIN SODIUM 5000 UNITS: 5000 INJECTION INTRAVENOUS; SUBCUTANEOUS at 05:44

## 2018-10-24 RX ADMIN — SODIUM CHLORIDE 75 ML/HR: 900 INJECTION INTRAVENOUS at 17:27

## 2018-10-24 RX ADMIN — LORAZEPAM 1 MG: 2 INJECTION INTRAMUSCULAR; INTRAVENOUS at 22:25

## 2018-10-24 RX ADMIN — PANTOPRAZOLE SODIUM 40 MG: 40 INJECTION, POWDER, FOR SOLUTION INTRAVENOUS at 09:55

## 2018-10-24 NOTE — ASSESSMENT & PLAN NOTE
The patient is hemodynamically stable POD#2 s/p diagnostic laparoscopy and repair of perforated gastric ulcer  Will continue ICU monitoring, providing pain control, and supportive measures  Must remain NPO with NGT for next few days  High dose PPI ordered  Zosyn day #3 for peritonitis/peritoneal contamination/questionable aspiration pneumonia  Will check portable chest x-ray today and upper GI series on 10/26/2018  Will hold IV fluids for a few hours due to prevent fluid overload, resume maintenance fluids later today  Replete phosphorus and recheck in AM  Pending psychiatry consult

## 2018-10-24 NOTE — ASSESSMENT & PLAN NOTE
· This was a hypovolemic hyponatremia  · Has resolved with IV fluids  · Will continue to replete phosphorus in view of the hypophosphatemia

## 2018-10-24 NOTE — SOCIAL WORK
-Discussed the POC with the interdisciplinary care team   Pt sedated  Mother at the [facility]  Mom Negrito Soliman ([phone])  Mother states the last she knew the pt was living with her father in [place]  Mother states the pt is an IV heroin abuser  Mother has not seen the pt in about 2 months  Mother states she can not speak for the pt and does not know where she will go upon discharge

## 2018-10-24 NOTE — NURSING NOTE
Call placed to Abida Alvarado PA-C to confirm transfer of patient to first floor med-Surg for bed availability in ICU
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
## 2018-10-24 NOTE — ASSESSMENT & PLAN NOTE
· Underwent a laparoscopic and repair of a large posterior gastric perforation on the lesser curvature of the stomach on 10/22/2018  · Patient is postop day 3  · All management as per the primary service which is general surgery

## 2018-10-24 NOTE — ASSESSMENT & PLAN NOTE
Finding on admission CT  Coverage on Zosyn currently day #3  Continue IS and ambulation when possible  Ongoing issues with pulse oximetry in light of patient's ongoing agitation  Will try to get portable chest x-ray today

## 2018-10-24 NOTE — NURSING NOTE
Patient agitated  Yelling profanities and insisting that BP cuff be removed  Cuff removed at this time

## 2018-10-24 NOTE — ASSESSMENT & PLAN NOTE
· Is more or less resolved  · Secondary to combination of the  perforated gastric ulcer and the suspected aspiration pneumonia

## 2018-10-25 ENCOUNTER — APPOINTMENT (INPATIENT)
Dept: RADIOLOGY | Facility: HOSPITAL | Age: 28
DRG: 223 | End: 2018-10-25
Payer: COMMERCIAL

## 2018-10-25 VITALS
DIASTOLIC BLOOD PRESSURE: 71 MMHG | OXYGEN SATURATION: 99 % | HEIGHT: 65 IN | SYSTOLIC BLOOD PRESSURE: 126 MMHG | WEIGHT: 140 LBS | HEART RATE: 88 BPM | TEMPERATURE: 99.5 F | RESPIRATION RATE: 17 BRPM | BODY MASS INDEX: 23.32 KG/M2

## 2018-10-25 LAB
ANION GAP SERPL CALCULATED.3IONS-SCNC: 12 MMOL/L (ref 4–13)
BUN SERPL-MCNC: 10 MG/DL (ref 7–25)
CALCIUM SERPL-MCNC: 9 MG/DL (ref 8.6–10.5)
CHLORIDE SERPL-SCNC: 102 MMOL/L (ref 98–107)
CO2 SERPL-SCNC: 22 MMOL/L (ref 21–31)
CREAT SERPL-MCNC: 0.61 MG/DL (ref 0.6–1.2)
GFR SERPL CREATININE-BSD FRML MDRD: 125 ML/MIN/1.73SQ M
GLUCOSE SERPL-MCNC: 67 MG/DL (ref 65–99)
PHOSPHATE SERPL-MCNC: 3.2 MG/DL (ref 3–5.5)
POTASSIUM SERPL-SCNC: 3.8 MMOL/L (ref 3.5–5.5)
SODIUM SERPL-SCNC: 136 MMOL/L (ref 134–143)

## 2018-10-25 PROCEDURE — 99232 SBSQ HOSP IP/OBS MODERATE 35: CPT | Performed by: PHYSICIAN ASSISTANT

## 2018-10-25 PROCEDURE — 74240 X-RAY XM UPR GI TRC 1CNTRST: CPT

## 2018-10-25 PROCEDURE — 84100 ASSAY OF PHOSPHORUS: CPT | Performed by: PHYSICIAN ASSISTANT

## 2018-10-25 PROCEDURE — 80048 BASIC METABOLIC PNL TOTAL CA: CPT | Performed by: PHYSICIAN ASSISTANT

## 2018-10-25 PROCEDURE — 99255 IP/OBS CONSLTJ NEW/EST HI 80: CPT | Performed by: NURSE PRACTITIONER

## 2018-10-25 PROCEDURE — 71046 X-RAY EXAM CHEST 2 VIEWS: CPT

## 2018-10-25 RX ADMIN — IOHEXOL 150 ML: 350 INJECTION, SOLUTION INTRAVENOUS at 14:39

## 2018-10-25 RX ADMIN — KETOROLAC TROMETHAMINE 15 MG: 30 INJECTION, SOLUTION INTRAMUSCULAR at 00:23

## 2018-10-25 RX ADMIN — Medication 3.38 G: at 05:02

## 2018-10-25 RX ADMIN — Medication 3.38 G: at 00:22
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
## 2018-10-25 NOTE — PLAN OF CARE
CARDIOVASCULAR - ADULT     Maintains optimal cardiac output and hemodynamic stability Completed     Absence of cardiac dysrhythmias or at baseline rhythm Completed        DISCHARGE PLANNING     Discharge to home or other facility with appropriate resources Completed    Pt signed out [address]  [address] Minimal or absence of nausea and/or vomiting Completed     Maintains or returns to baseline bowel function Completed     Maintains adequate nutritional intake Completed        GENITOURINARY - ADULT     Maintains or returns to baseline urinary function Completed     Urinary catheter remains patent Completed        HEMATOLOGIC - ADULT     Maintains hematologic stability Completed        INFECTION - ADULT     Absence or prevention of progression during hospitalization Completed        Knowledge Deficit     Patient/family/caregiver demonstrates understanding of disease process, treatment plan, medications, and discharge instructions Completed        METABOLIC, FLUID AND ELECTROLYTES - ADULT     Electrolytes maintained within normal limits Completed     Fluid balance maintained Completed        MUSCULOSKELETAL - ADULT     Maintain or return mobility to safest level of function Completed     Maintain proper alignment of affected body part Completed        Nutrition/Hydration-ADULT     Nutrient/Hydration intake appropriate for improving, restoring or maintaining nutritional needs Completed        PAIN - ADULT     Verbalizes/displays adequate comfort level or baseline comfort level Completed        Potential for Falls     Patient will remain free of falls Completed        Prexisting or High Potential for Compromised Skin Integrity     Skin integrity is maintained or improved Completed        RESPIRATORY - ADULT     Achieves optimal ventilation and oxygenation Completed        SAFETY ADULT     Patient will remain free of falls Completed     Maintain or return to baseline ADL function Completed     Maintain or return mobility status to optimal level Completed        SKIN/TISSUE INTEGRITY - ADULT     Skin integrity remains intact Completed     Incision(s), wounds(s) or drain site(s) healing without S/S of infection Completed     Oral mucous membranes remain intact Completed

## 2018-10-25 NOTE — PLAN OF CARE
CARDIOVASCULAR - ADULT     Maintains optimal cardiac output and hemodynamic stability Progressing     Absence of cardiac dysrhythmias or at baseline rhythm Progressing        DISCHARGE PLANNING     Discharge to home or other facility with appropriate resources Progressing        GASTROINTESTINAL - ADULT     Minimal or absence of nausea and/or vomiting Progressing     Maintains or returns to baseline bowel function Progressing     Maintains adequate nutritional intake Progressing        GENITOURINARY - ADULT     Maintains or returns to baseline urinary function Progressing     Urinary catheter remains patent Progressing        HEMATOLOGIC - ADULT     Maintains hematologic stability Progressing        INFECTION - ADULT     Absence or prevention of progression during hospitalization Progressing        Knowledge Deficit     Patient/family/caregiver demonstrates understanding of disease process, treatment plan, medications, and discharge instructions Progressing        METABOLIC, FLUID AND ELECTROLYTES - ADULT     Electrolytes maintained within normal limits Progressing     Fluid balance maintained Progressing        MUSCULOSKELETAL - ADULT     Maintain or return mobility to safest level of function Progressing     Maintain proper alignment of affected body part Progressing        Nutrition/Hydration-ADULT     Nutrient/Hydration intake appropriate for improving, restoring or maintaining nutritional needs Progressing        PAIN - ADULT     Verbalizes/displays adequate comfort level or baseline comfort level Progressing        Potential for Falls     Patient will remain free of falls Progressing        Prexisting or High Potential for Compromised Skin Integrity     Skin integrity is maintained or improved Progressing        RESPIRATORY - ADULT     Achieves optimal ventilation and oxygenation Progressing        SAFETY ADULT     Patient will remain free of falls Progressing     Maintain or return to baseline ADL function Progressing     Maintain or return mobility status to optimal level Progressing        SKIN/TISSUE INTEGRITY - ADULT     Skin integrity remains intact Progressing     Incision(s), wounds(s) or drain site(s) healing without S/S of infection Progressing     Oral mucous membranes remain intact Progressing
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
## 2018-10-25 NOTE — PROGRESS NOTES
Patient doing well from a medical standpoint  All of the patient's information was reviewed in the computer  Case discussed with the primary surgeon Dr Wahl [facility]  [facility] will sign off  Please do not hesitate to call or contact us in regards to any future medical related issues  Thank you for allowing us to participate in the care this patient

## 2018-10-25 NOTE — NURSING NOTE
Pt calm and cooperative this am but became restless throughout the day, support offered with fair result, pt agreed to stay for the UGI series, pt went down and back and tolerated the procedure well, pt pulled out the ngt after the test and was demanding to sign out AMA, pt encouraged to stay in the hospital until properly d/c'd but cont to want to sign out ama, dr Augusta Potts was called and made aware, came in to remove the pts tristian drain, pt tolerated well, he reviewed the reasons for staying in the hospital with the pt, pt signed out ama, refused w/c and left the hospital ama

## 2018-10-25 NOTE — ASSESSMENT & PLAN NOTE
The patient is clinically and hemodynamically stable POD#3 s/p emergent diagnostic laparoscopy and repair of perforated gastric ulcer  Her IV was removed and she has refused new IV at this time  For this reason we will change UGI series for today to check gastric repair  If no leak identified will transition to oral diet and oral medications  PPI, antibiotics, pain medications on hold currently

## 2018-10-25 NOTE — PROGRESS NOTES
Progress Note - Ale Haywood [birth year], 33 y o  female MRN: [id number]    Unit/Bed#: -02 Encounter: [id number]    Primary Care Provider: No primary care provider on file  Date and time admitted to hospital: 10/22/2018 12:49 AM        * Gastric ulcer with perforation but without obstruction, acute [facility])   Assessment & Plan    The patient is clinically and hemodynamically stable POD#3 s/p emergent diagnostic laparoscopy and repair of perforated gastric ulcer  Her IV was removed and she has refused new IV at this time  For this reason we will change UGI series for today to check gastric repair  If no leak identified will transition to oral diet and oral medications  PPI, antibiotics, pain medications on hold currently  Subjective/Objective   Chief Complaint: "I'm going home today"    Subjective: Patient agitated regarding prolonged hospitalization and her NG tube  Reports she wants to go home today citing her college studies as her reason  She reports feeling "fine" and declines further questioning or physical exam today  She is refusing replacement of her IV at this time  Objective: NG intact, no IV's  Blood pressure 119/62, pulse 76, temperature 97 6 °F (36 4 °C), temperature source Tympanic, resp  rate 16, height 5' 5" (1 651 m), weight 63 5 kg (140 lb), SpO2 98 %  ,Body mass index is 23 3 kg/m²  Intake/Output Summary (Last 24 hours) at 10/25/18 1417  Last data filed at 10/25/18 1101   Gross per 24 hour   Intake               50 ml   Output                0 ml   Net               50 ml       Invasive Devices     Drain            Closed/Suction Drain Left Abdomen Bulb 19 Fr  3 days    NG/OG/Enteral Tube Nasogastric 18 Fr Right nares 3 days    Urethral Catheter Latex 16 Fr  3 days          Airway            ETT  Cuffed;Oral 7 5 mm 3 days                Physical Exam   Constitutional: She appears well-developed and well-nourished  No distress     HENT:   Head: Normocephalic and atraumatic  Eyes: Pupils are equal, round, and reactive to light  Neck: Normal range of motion  Cardiovascular:   Refused by patient  Pulmonary/Chest:   Refused by patient  Abdominal:   Refused by patient  Musculoskeletal: Normal range of motion  Neurological: She is alert  Skin: She is not diaphoretic  Psychiatric: Her affect is angry  She is agitated  She expresses impulsivity  Lab, Imaging and other studies:  I have personally reviewed pertinent lab results    , CMP:   Lab Results   Component Value Date     10/25/2018    K 3 8 10/25/2018     10/25/2018    CO2 22 10/25/2018    BUN 10 10/25/2018    CREATININE 0 61 10/25/2018    CALCIUM 9 0 10/25/2018    EGFR 125 10/25/2018     VTE Pharmacologic Prophylaxis: Heparin  VTE Mechanical Prophylaxis: sequential compression device

## 2018-10-26 NOTE — UTILIZATION REVIEW
Notification of Discharge  This is a Notification of Discharge from our facility [address]  Please be advised that this patient has been discharge from our facility  Below you will find the admission and discharge date and time including the patients disposition  PRESENTATION DATE: 10/22/2018 12:49 AM  IP ADMISSION DATE: 10/22/18 0450  DISCHARGE DATE: 10/25/2018  5:40 PM  DISPOSITION: LEFT AMA    [address] [facility] in the [facility] Util[facility] Review Department  Phone: [phone]; Fax [phone]  ATTENTION: The Network Utilization Review Department is now centralized for our 9 Facilities  Make a note that we have a new phone and fax numbers for our Department  Please call with any questions or concerns to [phone] and carefully follow the prompts so that you are directed to the right person  All voicemails are confidential  Fax any determinations, approvals, denials, and requests for initial or continue stay review clinical to [phone]  Due to HIGH CALL volume, it would be easier if you could please send faxed requests to expedite your requests and in part, help us provide discharge notifications faster

## 2018-10-29 NOTE — DISCHARGE SUMMARY
Discharge Summary - [facility] Sample 33 y o  female MRN: [id number]    Unit/Bed#: -02 Encounter: [id number]    Admission Date:   Admission Orders     Ordered        10/22/18 0450  Inpatient Admission  Once               Admitting Diagnosis: Pneumoperitoneum [K66 8]  Back pain [M54 9]    HPI:  The patient presented with an acute abdomen secondary to a perforated viscus for which emergent surgery was indicated for diagnostic and therapeutic purposes as a lifesaving measure  Procedures Performed: No orders of the defined types were placed in this encounter  Summary of Hospital Course:  Patient was taken emergently to the operating room where she underwent a diagnostic laparoscopy and laparoscopic repair of a perforated posterior gastric ulcer along the lesser curvature of the stomach  She convalesced in the hospital for 3 days after which time an upper GI study was performed confirming that the ulcer was healed  Her nasogastric tube removed  That evening the patient signed out against medical advice  Prior to her release she did allow us to remove her Chance-Quinones drain  Significant Findings, Care, Treatment and Services Provided:  Laparoscopic Tanmay patch repair of perforated posterior gastric ulcer  Complications:  None    Discharge Diagnosis:   1  Perforated posterior lesser curvature gastric ulcer  2  Intravenous drug abuse and addiction  3  Noncompliance with medical advice  Resolved Problems  Date Reviewed: 10/25/2018    None          Condition at Discharge: good         Discharge instructions/Information to patient and family:   See after visit summary for information provided to patient and family  Provisions for Follow-Up Care:  See after visit summary for information related to follow-up care and any pertinent home health orders  PCP: No primary care provider on file      Disposition: Home    Planned Readmission: No    Discharge Statement   I spent 35 minutes discharging the patient  This time was spent on the day of discharge  I had direct contact with the patient on the day of discharge  Additional documentation is required if more than 30 minutes were spent on discharge  Discharge Medications:  See after visit summary for reconciled discharge medications provided to patient and family

## 2020-09-23 ENCOUNTER — APPOINTMENT (EMERGENCY)
Dept: RADIOLOGY | Facility: HOSPITAL | Age: 30
End: 2020-09-23

## 2020-09-23 ENCOUNTER — HOSPITAL ENCOUNTER (EMERGENCY)
Facility: HOSPITAL | Age: 30
Discharge: HOME | End: 2020-09-23
Attending: EMERGENCY MEDICINE | Admitting: EMERGENCY MEDICINE

## 2020-09-23 VITALS
TEMPERATURE: 97.3 F | WEIGHT: 140.65 LBS | BODY MASS INDEX: 23.43 KG/M2 | OXYGEN SATURATION: 100 % | HEART RATE: 85 BPM | HEIGHT: 65 IN | RESPIRATION RATE: 22 BRPM | DIASTOLIC BLOOD PRESSURE: 85 MMHG | SYSTOLIC BLOOD PRESSURE: 141 MMHG
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
## 2020-09-23 DIAGNOSIS — S81.852A DOG BITE OF LEFT LOWER LEG: Primary | ICD-10-CM

## 2020-09-23 PROCEDURE — 99284 EMERGENCY DEPT VISIT MOD MDM: CPT | Performed by: EMERGENCY MEDICINE

## 2020-09-23 PROCEDURE — 73610 X-RAY EXAM OF ANKLE: CPT

## 2020-09-23 PROCEDURE — 99283 EMERGENCY DEPT VISIT LOW MDM: CPT

## 2020-09-23 PROCEDURE — 90715 TDAP VACCINE 7 YRS/> IM: CPT | Performed by: EMERGENCY MEDICINE

## 2020-09-23 PROCEDURE — 90471 IMMUNIZATION ADMIN: CPT

## 2020-09-23 PROCEDURE — 12002 RPR S/N/AX/GEN/TRNK2.6-7.5CM: CPT | Performed by: EMERGENCY MEDICINE

## 2020-09-23 RX ORDER — AMOXICILLIN AND CLAVULANATE POTASSIUM 875; 125 MG/1; MG/1
1 TABLET, FILM COATED ORAL EVERY 12 HOURS
Qty: 14 TABLET | Refills: 0 | Status: SHIPPED | OUTPATIENT
Start: 2020-09-23 | End: 2020-09-30

## 2020-09-23 RX ORDER — GINSENG 100 MG
1 CAPSULE ORAL ONCE
Status: COMPLETED | OUTPATIENT
Start: 2020-09-23 | End: 2020-09-23

## 2020-09-23 RX ORDER — LIDOCAINE HYDROCHLORIDE AND EPINEPHRINE 10; 10 MG/ML; UG/ML
10 INJECTION, SOLUTION INFILTRATION; PERINEURAL ONCE
Status: COMPLETED | OUTPATIENT
Start: 2020-09-23 | End: 2020-09-23

## 2020-09-23 RX ORDER — AMOXICILLIN AND CLAVULANATE POTASSIUM 875; 125 MG/1; MG/1
1 TABLET, FILM COATED ORAL ONCE
Status: COMPLETED | OUTPATIENT
Start: 2020-09-23 | End: 2020-09-23

## 2020-09-23 RX ORDER — ACETAMINOPHEN 325 MG/1
650 TABLET ORAL ONCE
Status: COMPLETED | OUTPATIENT
Start: 2020-09-23 | End: 2020-09-23

## 2020-09-23 RX ORDER — OXYCODONE HYDROCHLORIDE 5 MG/1
5 TABLET ORAL ONCE
Status: COMPLETED | OUTPATIENT
Start: 2020-09-23 | End: 2020-09-23

## 2020-09-23 RX ORDER — IBUPROFEN 400 MG/1
400 TABLET ORAL ONCE
Status: COMPLETED | OUTPATIENT
Start: 2020-09-23 | End: 2020-09-23

## 2020-09-23 RX ADMIN — ACETAMINOPHEN 650 MG: 325 TABLET, FILM COATED ORAL at 23:06

## 2020-09-23 RX ADMIN — OXYCODONE HYDROCHLORIDE 5 MG: 5 TABLET ORAL at 21:51

## 2020-09-23 RX ADMIN — BACITRACIN 1 SMALL APPLICATION: 500 OINTMENT TOPICAL at 22:52

## 2020-09-23 RX ADMIN — IBUPROFEN 400 MG: 400 TABLET ORAL at 23:06

## 2020-09-23 RX ADMIN — AMOXICILLIN AND CLAVULANATE POTASSIUM 1 TABLET: 875; 125 TABLET, FILM COATED ORAL at 21:51

## 2020-09-23 RX ADMIN — TETANUS TOXOID, REDUCED DIPHTHERIA TOXOID AND ACELLULAR PERTUSSIS VACCINE, ADSORBED 0.5 ML: 5; 2.5; 8; 8; 2.5 SUSPENSION INTRAMUSCULAR at 22:53

## 2020-09-23 RX ADMIN — LIDOCAINE HYDROCHLORIDE,EPINEPHRINE BITARTRATE 10 ML: 10; .01 INJECTION, SOLUTION INFILTRATION; PERINEURAL at 21:52

## 2022-01-16 NOTE — ASSESSMENT & PLAN NOTE
The patient is hemodynamically stable POD#1 s/p diagnostic laparoscopy and repair of perforated gastric ulcer  Will continue ICU monitoring, providing pain control, and supportive measures  Must remain NPO with NGT for next few days  High dose PPI ordered  Zosyn day #2 for peritonitis/peritoneal contamination/questionable aspiration pneumonia  Will check chest x-ray and upper GI series on 10/26/2018 or sooner as needed  stated

## 2022-08-25 ENCOUNTER — TELEPHONE (OUTPATIENT)
Dept: OTHER | Facility: OTHER | Age: 32
End: 2022-08-25

## 2023-02-17 ENCOUNTER — APPOINTMENT (INPATIENT)
Dept: ULTRASOUND IMAGING | Facility: HOSPITAL | Age: 33
End: 2023-02-17

## 2023-02-17 ENCOUNTER — HOSPITAL ENCOUNTER (INPATIENT)
Facility: HOSPITAL | Age: 33
LOS: 2 days | Discharge: HOME | End: 2023-02-19
Attending: EMERGENCY MEDICINE | Admitting: EMERGENCY MEDICINE

## 2023-02-17 DIAGNOSIS — F13.90 BENZODIAZEPINE MISUSE: ICD-10-CM

## 2023-02-17 DIAGNOSIS — F15.10 METHAMPHETAMINE ABUSE (HCC): ICD-10-CM

## 2023-02-17 DIAGNOSIS — L02.416 ABSCESS OF LEFT LOWER EXTREMITY: ICD-10-CM

## 2023-02-17 DIAGNOSIS — F11.10 HEROIN ABUSE (HCC): ICD-10-CM

## 2023-02-17 DIAGNOSIS — F13.10 BENZODIAZEPINE ABUSE (HCC): ICD-10-CM

## 2023-02-17 DIAGNOSIS — K27.9 PEPTIC ULCER DISEASE: ICD-10-CM

## 2023-02-17 DIAGNOSIS — L02.411 ABSCESS OF RIGHT AXILLA: Primary | ICD-10-CM

## 2023-02-17 DIAGNOSIS — F11.20 OPIOID USE DISORDER, SEVERE, DEPENDENCE (HCC): ICD-10-CM

## 2023-02-17 PROBLEM — D72.829 LEUKOCYTOSIS: Status: ACTIVE | Noted: 2018-10-22

## 2023-02-17 PROBLEM — F17.200 TOBACCO USE DISORDER: Status: ACTIVE | Noted: 2023-02-17

## 2023-02-17 PROBLEM — F13.939 BENZODIAZEPINE WITHDRAWAL WITH COMPLICATION (HCC): Status: ACTIVE | Noted: 2023-02-17

## 2023-02-17 PROBLEM — R74.01 TRANSAMINITIS: Status: ACTIVE | Noted: 2023-02-17

## 2023-02-17 PROBLEM — F19.10 POLYSUBSTANCE ABUSE (HCC): Status: ACTIVE | Noted: 2023-02-17

## 2023-02-17 PROBLEM — F13.20 MODERATE BENZODIAZEPINE USE DISORDER (HCC): Status: ACTIVE | Noted: 2023-02-17

## 2023-02-17 PROBLEM — F11.93 OPIOID WITHDRAWAL (HCC): Status: ACTIVE | Noted: 2023-02-17

## 2023-02-17 LAB
ALBUMIN SERPL BCP-MCNC: 4.1 G/DL (ref 3.5–5)
ALP SERPL-CCNC: 89 U/L (ref 43–122)
ALT SERPL W P-5'-P-CCNC: 81 U/L
AMPHETAMINES SERPL QL SCN: POSITIVE
ANION GAP SERPL CALCULATED.3IONS-SCNC: 8 MMOL/L (ref 5–14)
AST SERPL W P-5'-P-CCNC: 72 U/L (ref 14–36)
ATRIAL RATE: 106 BPM
BARBITURATES UR QL: NEGATIVE
BASOPHILS # BLD AUTO: 0.07 THOUSANDS/ÂΜL (ref 0–0.1)
BASOPHILS NFR BLD AUTO: 1 % (ref 0–1)
BENZODIAZ UR QL: POSITIVE
BILIRUB SERPL-MCNC: 0.6 MG/DL (ref 0.2–1)
BUN SERPL-MCNC: 14 MG/DL (ref 5–25)
CALCIUM SERPL-MCNC: 9.4 MG/DL (ref 8.4–10.2)
CHLORIDE SERPL-SCNC: 103 MMOL/L (ref 96–108)
CO2 SERPL-SCNC: 27 MMOL/L (ref 21–32)
COCAINE UR QL: NEGATIVE
CREAT SERPL-MCNC: 0.97 MG/DL (ref 0.6–1.2)
EOSINOPHIL # BLD AUTO: 0.38 THOUSAND/ÂΜL (ref 0–0.61)
EOSINOPHIL NFR BLD AUTO: 3 % (ref 0–6)
ERYTHROCYTE [DISTWIDTH] IN BLOOD BY AUTOMATED COUNT: 13.2 % (ref 11.6–15.1)
ETHANOL SERPL-MCNC: <10 MG/DL (ref 0–10)
EXT PREGNANCY TEST URINE: NEGATIVE
EXT. CONTROL: NORMAL
GFR SERPL CREATININE-BSD FRML MDRD: 77 ML/MIN/1.73SQ M
GLUCOSE SERPL-MCNC: 101 MG/DL (ref 70–99)
HAV IGM SER QL: ABNORMAL
HBV CORE IGM SER QL: ABNORMAL
HBV SURFACE AG SER QL: ABNORMAL
HCT VFR BLD AUTO: 44.7 % (ref 34.8–46.1)
HCV AB SER QL: ABNORMAL
HGB BLD-MCNC: 14.7 G/DL (ref 11.5–15.4)
HIV 1+2 AB+HIV1 P24 AG SERPL QL IA: NORMAL
HIV1 P24 AG SER QL: NORMAL
IMM GRANULOCYTES # BLD AUTO: 0.02 THOUSAND/UL (ref 0–0.2)
IMM GRANULOCYTES NFR BLD AUTO: 0 % (ref 0–2)
LYMPHOCYTES # BLD AUTO: 3.08 THOUSANDS/ÂΜL (ref 0.6–4.47)
LYMPHOCYTES NFR BLD AUTO: 28 % (ref 14–44)
MAGNESIUM SERPL-MCNC: 2 MG/DL (ref 1.6–2.3)
MCH RBC QN AUTO: 30.9 PG (ref 26.8–34.3)
MCHC RBC AUTO-ENTMCNC: 32.9 G/DL (ref 31.4–37.4)
MCV RBC AUTO: 94 FL (ref 82–98)
METHADONE UR QL: NEGATIVE
MONOCYTES # BLD AUTO: 0.79 THOUSAND/ÂΜL (ref 0.17–1.22)
MONOCYTES NFR BLD AUTO: 7 % (ref 4–12)
NEUTROPHILS # BLD AUTO: 6.83 THOUSANDS/ÂΜL (ref 1.85–7.62)
NEUTS SEG NFR BLD AUTO: 61 % (ref 43–75)
NRBC BLD AUTO-RTO: 0 /100 WBCS
OPIATES UR QL SCN: NEGATIVE
OXYCODONE+OXYMORPHONE UR QL SCN: NEGATIVE
P AXIS: 13 DEGREES
PCP UR QL: NEGATIVE
PLATELET # BLD AUTO: 261 THOUSANDS/UL (ref 149–390)
PMV BLD AUTO: 9.3 FL (ref 8.9–12.7)
POTASSIUM SERPL-SCNC: 3.7 MMOL/L (ref 3.5–5.3)
PR INTERVAL: 140 MS
PROT SERPL-MCNC: 8.6 G/DL (ref 6.4–8.4)
QRS AXIS: 16 DEGREES
QRSD INTERVAL: 64 MS
QT INTERVAL: 346 MS
QTC INTERVAL: 459 MS
RBC # BLD AUTO: 4.76 MILLION/UL (ref 3.81–5.12)
SODIUM SERPL-SCNC: 138 MMOL/L (ref 135–147)
T WAVE AXIS: 25 DEGREES
THC UR QL: NEGATIVE
VENTRICULAR RATE: 106 BPM
WBC # BLD AUTO: 11.17 THOUSAND/UL (ref 4.31–10.16)

## 2023-02-17 PROCEDURE — HZ2ZZZZ DETOXIFICATION SERVICES FOR SUBSTANCE ABUSE TREATMENT: ICD-10-PCS | Performed by: EMERGENCY MEDICINE

## 2023-02-17 RX ORDER — CEPHALEXIN 500 MG/1
500 CAPSULE ORAL EVERY 6 HOURS SCHEDULED
Status: DISCONTINUED | OUTPATIENT
Start: 2023-02-17 | End: 2023-02-17

## 2023-02-17 RX ORDER — PANTOPRAZOLE SODIUM 40 MG/1
40 TABLET, DELAYED RELEASE ORAL
Status: DISCONTINUED | OUTPATIENT
Start: 2023-02-17 | End: 2023-02-19 | Stop reason: HOSPADM

## 2023-02-17 RX ORDER — ACETAMINOPHEN 325 MG/1
650 TABLET ORAL EVERY 6 HOURS PRN
Status: DISCONTINUED | OUTPATIENT
Start: 2023-02-17 | End: 2023-02-19 | Stop reason: HOSPADM

## 2023-02-17 RX ORDER — TRAZODONE HYDROCHLORIDE 50 MG/1
50 TABLET ORAL
Status: DISCONTINUED | OUTPATIENT
Start: 2023-02-17 | End: 2023-02-19 | Stop reason: HOSPADM

## 2023-02-17 RX ORDER — ENOXAPARIN SODIUM 100 MG/ML
40 INJECTION SUBCUTANEOUS DAILY
Status: DISCONTINUED | OUTPATIENT
Start: 2023-02-17 | End: 2023-02-19 | Stop reason: HOSPADM

## 2023-02-17 RX ORDER — BUPRENORPHINE 20 UG/H
20 PATCH TRANSDERMAL
Status: DISCONTINUED | OUTPATIENT
Start: 2023-02-17 | End: 2023-02-19

## 2023-02-17 RX ORDER — CLONAZEPAM 1 MG/1
1 TABLET ORAL EVERY 6 HOURS PRN
Status: DISCONTINUED | OUTPATIENT
Start: 2023-02-17 | End: 2023-02-19

## 2023-02-17 RX ORDER — SULFAMETHOXAZOLE AND TRIMETHOPRIM 800; 160 MG/1; MG/1
1 TABLET ORAL EVERY 12 HOURS SCHEDULED
Status: DISCONTINUED | OUTPATIENT
Start: 2023-02-17 | End: 2023-02-18

## 2023-02-17 RX ORDER — NICOTINE 21 MG/24HR
1 PATCH, TRANSDERMAL 24 HOURS TRANSDERMAL DAILY
Status: DISCONTINUED | OUTPATIENT
Start: 2023-02-17 | End: 2023-02-19 | Stop reason: HOSPADM

## 2023-02-17 RX ORDER — GABAPENTIN 300 MG/1
300 CAPSULE ORAL 3 TIMES DAILY PRN
Status: DISCONTINUED | OUTPATIENT
Start: 2023-02-17 | End: 2023-02-19 | Stop reason: HOSPADM

## 2023-02-17 RX ORDER — CLONIDINE HYDROCHLORIDE 0.1 MG/1
0.1 TABLET ORAL EVERY 6 HOURS PRN
Status: DISCONTINUED | OUTPATIENT
Start: 2023-02-17 | End: 2023-02-19

## 2023-02-17 RX ADMIN — BUPRENORPHINE 20 MCG: 20 PATCH TRANSDERMAL at 04:53

## 2023-02-17 RX ADMIN — CEPHALEXIN 500 MG: 500 CAPSULE ORAL at 11:19

## 2023-02-17 RX ADMIN — SULFAMETHOXAZOLE AND TRIMETHOPRIM 1 TABLET: 800; 160 TABLET ORAL at 20:40

## 2023-02-17 RX ADMIN — CLONIDINE HYDROCHLORIDE 0.1 MG: 0.1 TABLET ORAL at 07:05

## 2023-02-17 RX ADMIN — PANTOPRAZOLE SODIUM 40 MG: 40 TABLET, DELAYED RELEASE ORAL at 06:40

## 2023-02-17 RX ADMIN — ACETAMINOPHEN 650 MG: 325 TABLET ORAL at 08:28

## 2023-02-17 RX ADMIN — GABAPENTIN 300 MG: 300 CAPSULE ORAL at 08:28

## 2023-02-17 RX ADMIN — NICOTINE POLACRILEX 2 MG: 2 GUM, CHEWING ORAL at 06:15

## 2023-02-17 RX ADMIN — VANCOMYCIN HYDROCHLORIDE 750 MG: 750 INJECTION, SOLUTION INTRAVENOUS at 12:58

## 2023-02-17 RX ADMIN — MULTIPLE VITAMINS W/ MINERALS TAB 1 TABLET: TAB ORAL at 08:25

## 2023-02-17 RX ADMIN — SULFAMETHOXAZOLE AND TRIMETHOPRIM 1 TABLET: 800; 160 TABLET ORAL at 08:25

## 2023-02-17 NOTE — ASSESSMENT & PLAN NOTE
· Pt also has an acutely inflamed abscess of R anterior axilla  · Denies injecting into her upper extremities  · Notes recurrent b/l axillary abscesses, at least 3 within the past 1 year, which have resolved spontaneously after self-expressing drainage  · Possibly 2/2 hidradenitis suppurativa  · Initiate ABX as above- Keflex and Bactrim  · OP referral to dermatology on d/c

## 2023-02-17 NOTE — PROGRESS NOTES
Yony Lo is a 29 y o  female who is currently ordered Vancomycin IV with management by the Pharmacy Consult service  Relevant clinical data and objective / subjective history reviewed  Vancomycin Assessment:  Indication and Goal AUC/Trough: Soft tissue (goal -600, trough >10)  Clinical Status: new  Renal Function:  SCr: 0 97mg/dL  CrCl: 76 9 mL/min  Days of Therapy: 0  Current Dose: 1000mg IV every 12 hours  Vancomycin Plan:  New Dosin mg IV every 12 hours  Estimated AUC: 437 mcg*hr/mL  Estimated Trough: 13 6 mcg/mL  Next Level: 23 6:00 vancomycin random  Renal Function Monitoring: Daily BMP and [place] will continue to follow closely for s/sx of nephrotoxicity, infusion reactions and appropriateness of therapy  BMP and CBC will be ordered per protocol  We will continue to follow the patient’s culture results and clinical progress daily      Alfred Belle, Pharmacist

## 2023-02-17 NOTE — H&P
[facility]  H&P- Zakiya Layer 1990, 29 y o  female MRN: [id number]  Unit/Bed#: 5T DETOX 512-01 Encounter: [id number]  Primary Care Provider: No primary care provider on file     Date and time admitted to hospital: 2/17/2023 12:14 AM  HISTORY & PHYSICAL EXAM  DEPARTMENT OF MEDICAL TOXICOLOGY  LEVEL 4 MEDICAL DETOX UNIT  Zakiya Jade 29 y o  female MRN: [id number]  Unit/Bed#: 5T DETOX 512-01 Encounter: [id number]      Reason for Admission/Principal Problem: Opioid withdrawal, opioid use disorder  Admitting Provider: Radha Hardin PA-C  Attending Provider: Elisa Johnson*   2/17/2023 12:14 AM      * Opioid withdrawal ([facility] [address] )  Assessment & Plan  · Patient with a history of chronic opioid use  · Last use was yesterday 2/16 around 11 PM  · Initiate COWS protocol with plan for eventual microinduction with Suboxone  · Currently mild opioid withdrawal s/sx of anxiety, LE myalgias, rhinorrhea  · Initiate Butrans 20 mcg/hr patch   · Continue monitoring opioid withdrawal, and plan for low-dose buprenorphine microinduction later tonight versus tomorrow based on pt's symptom progression  · Symptomatic and supportive care  · Continuous pulse oximetry monitoring    Opioid use disorder, severe, dependence ([facility] [address] )  Assessment & Plan  · Patient with a history of chronic IV fentanyl use  · Uses 1/2 brick (about 2 5 bundles) daily   · PDMP reviewed: 1 prescription for Suboxone   · Management of opioid withdrawal under MAT protocol as above  · Screen for hepatitis/HIV with h/o IVDU  · Case management consulted for assistance with rehab resources - pt expresses interest in entering a recovery house after d/c    Benzodiazepine misuse  Assessment & Plan  · Patient with a h/o chronic benzodiazepine use x1 year to help with sleep and ameliorate opioid withdrawal sx  · Using Xanax 0 5 mg PO 5-7 times per week  · Denies H/o benzo withdrawal or w/d seizures  · Last use 2 nights ago, 2/15/23  · Given limited quantity/frequency of pt's benzo use and low risk for developing significant w/d, will monitor pt off SEWS protocol at this time  · Encouraged cessation of benzodiazepine use      Abscess of left lower extremity  Assessment & Plan  · Abscess of lateral L thigh from pt injecting IV drugs, +erythema, induration, point TTP around 5 o'clock position, and necrotic tissue in the center  · U/S soft tissue LLE pending  · +Mild Leukocytosis on CBC, pt afebrile, VSS  · Initiate ABX with Keflex and Bactrim for MRSA coverage  · Continue monitoring    Abscess of right axilla  Assessment & Plan  · Pt also has an acutely inflamed abscess of R anterior axilla  · Denies injecting into her upper extremities  · Notes recurrent b/l axillary abscesses, at least 3 within the past 1 year, which have resolved spontaneously after self-expressing drainage  · Possibly 2/2 hidradenitis suppurativa  · Initiate ABX as above- Keflex and Bactrim  · OP referral to dermatology on d/c    Peptic ulcer disease  Assessment & Plan  · Pt with a h/o PUD  · Was previously on Zantac prior to product recall, no current home medications  · Initiate daily PPI    Leukocytosis  Assessment & Plan  WBC mildly elevated at 11 17 on admission  Possibly 2/2 leukemoid reaction from acute withdrawal  No evidence of active infection, pt afebrile and VSS  Continue monitoring CBC    Transaminitis  Assessment & Plan  · Elevated LFTs on admission: AST 72, ALT 81  · Possibly 2/2 impaired liver function in the setting of IVDU  · Pt denies abd pain  · Acute hepatitis panel pending  · Continue monitoring CMP  · Encourage cessation of IVDU    Polysubstance abuse ([facility] [address] )  Assessment & Plan  · Polysubstance abuse with daily fentanyl and methamphetamine, almost daily low-dose Xanax, and intermittent marijuana/cocaine use  · UDS + for methamphetamine, benzos  · Encourage cessation of all substance use    Tobacco use disorder  Assessment & Plan  · Pt reports smoking 0 5-1 ppd of cigarettes  · Offered and accepted NRT   · Encourage cessation            Additional medical treatment(s) includes none       VTE Prophylaxis: Enoxaparin (Lovenox)  / sequential compression device   Code Status: full code      Anticipated Length of Stay:  Patient will be admitted on an Inpatient basis with an anticipated length of stay of  >2  midnights  Justification for Hospital Stay: benzodiazepine withdrawal, benzodiazepine use disorder, opioid withdrawal, opioid use disorder     For any questions or concerns, please Tiger Text the advanced practitioner in the role of [facility]-DETOX-AP On Call      This patient qualifies for Level IV medically managed intensive inpatient services under the criteria set by the American Society of Addiction Medicine, including dimensions 1-3  The patient is in withdrawal (or is intoxicated with high risk of withdrawal), with severe and unstable medical and/or psychiatric (dual diagnosis) problems, requiring requires 24-hour medical and nursing care and the full resources of a [address] patient to medical detox unit and continue supportive care and stabilization of acute opioid withdrawal per medical toxicology/detox medication assisted treatment pathway  Complicated Opioid Withdrawal (ie associated with long acting agents, such as methadone or illicit fentanyl analogues):  • >72 hours: Routine COWS/induction as per uncomplicated opoid withdrawal (below)  • <72 hours:  • Adjunctive medications (see below), including clonazepam 1-2mg Q6 hrs PRN anxiety (or diazepam 5 mg if cannot take PO)  • >48 hours, test dose buprenorphine 0 5-1mg     • If responds well, continue with 2mg Q2 hrs (total 8mg) holding for worsening withdrawal, then start maintenance dosing   • If responds poorly, follow next step below   • <48 hours and/or COWS < 6 or failed test dose, add Butrans transdermal patch 20-40mcg/hr, monitor for signs/symptoms of withdrawal   • If within first 24-48 hrs, can administer buprenorphine 0 5-1mg Q6 hrs x 4, followed by 2mg Q2 hrs x 4 the next day  • If surpassing 48 hrs, can administer buprenorphine 2mg Q2hrs if tolerated without transdermal patch or lower sublingual dose  • When complete, remove transdermal patch and start maintenance dosing   • For moderate-severe withdrawal (COWS >/= 8, may still consider routine induction with buprenorphine 8 mg if appropriate  • For worsened or precipitated withdrawal, consider adjunctive benzodiazepines and other comfort meds  Dexmedetomidine may be considered for severe precipitated withdrawal          Uncomplicated Opioid Withdrawal:  Monitor opioid severity via Clinical Opioid Withdrawal Scale (COWS) Q4 hours and administer buprenorphine/naloxone 8mg/2mg when COWS >8, or when greater than 24 hours have elapsed from most recent opioid use (excluding long-acting opioids, such as methadone)  Continue to monitor opioid severity Q30-60 minutes after first dose and administer additional buprenorphine 2-4mg every 30-60 minutes until COWS < 8 for two consecutive hours  Adjunctive medications administered as needed:  Clonidine 0 1 mg PO Q6 hours PRN anxiety or palpitations    Gabapentin 300mg PO Q8 hours PRN anxiety    Ibuprofen 600 mg PO Q6 hours PRN pain    Acetaminophen 1000mg PO Q8 hours PRN pain    Ondansetron 4 mg PO Q6 hours PRN N/V    Nicotine patch 7, 14, 21 mg  PRN nicotine withdrawal   Trazodone 50 mg PO QHS PRN sleep    Loperamide 4 mg PO PRN diarrhea up to 16 mg/day       The risks, benefits and mechanism of buprenorphine/naloxone were discussed and patient agreed to treatment  Case management consultation will take place to assist with coordination of subsequent treatment after discharge  Administer daily multivitamin  Evaluate and treat for coexisting substance use, such as nicotine   Discuss risk factors for infectious disease, such as history of intravenous drug abuse, and offer hepatitis and HIV screening if indicated  Hx and PE limited by: none, pt alert and cooperative    HPI: Radha Rodriguez is a 29y o  year old female with a PMH of OUD, benzodiazepine misuse, polysubstance abuse, and peptic ulcer disease who presents with opioid withdrawal seeking detoxification  Patient initially presented to the [facility] ED requesting detoxification from benzos and opioids and was subsequently admitted to the [facility] medical detox unit for medically assisted opioid withdrawal and MAT initiation with Suboxone  Patient reports using 1/2 brick of fentanyl daily via IV and insufflation  Last opioid use was yesterday around 11 PM  Reports injecting into her BLEs  She also reports regular use of Xanax about 0 5 mg 5-7 times per week  Her last Xanax use was 2 nights ago, 2/15/23  Denies any h/o benzodiazepine withdrawal or w/d seizures  Admits to daily use of methamphetamine IV with the fentanyl and  intermittent use of marijuana and cocaine once in a great while  Patient agreeable to initiation of Butrans 20 mcg/hr patch and supportive care at this time with plan for microinduction with Suboxone when at least 24 hours have passed since last opioid use  She plans to enter a recovery house after discharge and is agreeable to continue OP MAT follow up  Opioids currently used: fentanyl  Route of use: intravenous  Date/Time of Last Opioid Use: yesterday around 11 PM  Current Signs/Symptoms of Opioid Withdrawal: anxiety, myalgias/arthralgias and rhinorrhea/lacrimation    COWS score:   Clinical Opiate Withdrawal Scale  Pulse: 86  Resting Pulse Rate: Measured After Patient is Sitting or Lying for One Minute: Pulse rate   GI Upset: Over Last Half Hour: No GI symptoms  Sweating: Over Past Half Hour Not Accounted for by Room Temperature of Patient Activity: No report of chills or flushing  Tremor: Observation of Outstretched Hands:  No tremor  Restlessness: Observation During Assessment: Able to sit still  Yawning: Observation During Assessment: No yawning  Pupil Size: Pupils pinned or normal size for room light  Anxiety and Irritability: None  Bone or Joint Aches: If Patient was Having Pain Previously, Only the Additional Component Attributed to Opiate Withdrawal is Scored: Not present  Gooseflesh Skin: Skin is smooth  Runny Nose or Tearing: Not Accounted for by Cold Symptoms or Allergies: Nasal stuffiness of unusually moist eyes  Clinical Opiate Withdrawal Scale Total Score: 2        Methadone & Buprenorphine History  History of prior treatment for opioid dependence? no  Currently on Methadone Maintenance? no  History of prior treatment with Suboxone? no  Currently taking Suboxone? no  History of using Suboxone without having a prescription? no  History of IVDA? yes  Co-existing substance use? yes    Review of PDMP: yes    Social History     Substance and Sexual Activity   Alcohol Use Yes    Comment: socially     Social History     Substance and Sexual Activity   Drug Use Yes   • Types: Heroin, Benzodiazepines, Fentanyl, Other    Comment: about 5 times daily     Social History     Tobacco Use   Smoking Status Every Day   • Packs/day: 0 50   • Types: Cigarettes   Smokeless Tobacco Never       Review of Systems   Constitutional: Negative for appetite change, chills, diaphoresis and fever  HENT: Positive for rhinorrhea  Negative for congestion and sore throat  Eyes: Negative for visual disturbance  Negative for lacrimation   Respiratory: Negative for cough and shortness of breath  Cardiovascular: Negative for chest pain and palpitations  Gastrointestinal: Negative for abdominal pain, blood in stool, diarrhea, nausea and vomiting  Genitourinary: Negative for difficulty urinating and hematuria  Musculoskeletal: Positive for myalgias (LLE)  Negative for arthralgias and gait problem     Skin: Positive for color change (abscesses- healing L thigh and acute R axilla)  Negative for rash  Neurological: Negative for dizziness, tremors, seizures, weakness, light-headedness, numbness and headaches  Psychiatric/Behavioral: Negative for dysphoric mood, hallucinations and suicidal ideas  The patient is not nervous/anxious  Historical Information   Past Medical History:   Diagnosis Date   • Heroin abuse [facility])      Past Surgical History:   Procedure Laterality Date   • MN LAPS ABD PRTM&OMENTUM DX W/WO SPEC BR/WA SPX N/A 10/22/2018    Procedure: LAPAROSCOPY DIAGNOSTIC;  Surgeon: Melba Mercado MD;  Location: [facility] MAIN OR;  Service: General     History reviewed  No pertinent family history    Social History   Marital Status: Single   Occupation: unemployed  Patient Pre-hospital Living Situation: lives alone currently  Patient Pre-hospital Level of Mobility: independent   Patient Pre-hospital Diet Restrictions: GERD/PUD- no spicy/fried foods    No Known Allergies    Prior to Admission medications    Not on File       Current Facility-Administered Medications   Medication Dose Route Frequency   • acetaminophen (TYLENOL) tablet 650 mg  650 mg Oral Q6H PRN   • cephalexin (KEFLEX) capsule 500 mg  500 mg Oral Q6H NELLIE   • clonazePAM (KlonoPIN) tablet 1 mg  1 mg Oral Q6H PRN   • cloNIDine (CATAPRES) tablet 0 1 mg  0 1 mg Oral Q6H PRN   • enoxaparin (LOVENOX) subcutaneous injection 40 mg  40 mg Subcutaneous Daily   • gabapentin (NEURONTIN) capsule 300 mg  300 mg Oral TID PRN   • multivitamin-minerals (CENTRUM) tablet 1 tablet  1 tablet Oral Daily   • nicotine (NICODERM CQ) 14 mg/24hr TD 24 hr patch 1 patch  1 patch Transdermal Daily   • nicotine polacrilex (NICORETTE) gum 2 mg  2 mg Oral Q2H PRN   • pantoprazole (PROTONIX) EC tablet 40 mg  40 mg Oral Early Morning   • sulfamethoxazole-trimethoprim (BACTRIM DS) 800-160 mg per tablet 1 tablet  1 tablet Oral Q12H [address]   • transdermal buprenorphine (BUTRANS) 20 mcg/hr TD patch 20 mcg  20 mcg Transdermal Q7 Days   • traZODone (DESYREL) tablet 50 mg  50 mg Oral HS PRN       Continuous Infusions:          Objective     No intake or output data in the 24 hours ending 02/17/23 0658    Invasive Devices:   Peripheral IV 02/17/23 Proximal;Right;Ventral (anterior) Forearm (Active)   Site Assessment WDL; Clean;Dry; Intact 02/17/23 0152   Dressing Type Transparent;Securing device 02/17/23 0152   Line Status Blood return noted; Flushed;Saline locked 02/17/23 0152   Dressing Status Clean;Dry; Intact 02/17/23 0152       Vitals   Vitals:    02/17/23 0008 02/17/23 0311 02/17/23 0454   BP: 143/92 124/76 115/69   TempSrc: Oral  Temporal   Pulse: (!) 120 99 86   Resp: 16 12 12   Patient Position - Orthostatic VS: Sitting Sitting Sitting   Temp: 98 5 °F (36 9 °C)  98 5 °F (36 9 °C)       Physical Exam  Vitals and nursing note reviewed  Constitutional:       General: She is not in acute distress  Appearance: She is not diaphoretic  HENT:      Head: Normocephalic and atraumatic  Right Ear: External ear normal       Left Ear: External ear normal       Nose: Nose normal       Mouth/Throat:      Mouth: Mucous membranes are moist       Comments: No tongue fasciculations present  Eyes:      Extraocular Movements: Extraocular movements intact  Right eye: No nystagmus  Left eye: No nystagmus  Conjunctiva/sclera: Conjunctivae normal       Pupils: Pupils are equal, round, and reactive to light  Cardiovascular:      Rate and Rhythm: Normal rate and regular rhythm  Pulses:           Dorsalis pedis pulses are 2+ on the right side and 2+ on the left side  Posterior tibial pulses are 2+ on the right side and 2+ on the left side  Heart sounds: Normal heart sounds  No murmur heard  No friction rub  No gallop  Pulmonary:      Effort: Pulmonary effort is normal  No respiratory distress  Breath sounds: Normal breath sounds  No wheezing, rhonchi or rales     Abdominal:      General: Bowel sounds are normal  There is no distension  Palpations: Abdomen is soft  Tenderness: There is no abdominal tenderness  There is no guarding or rebound  Musculoskeletal:         General: No swelling  Cervical back: Neck supple  Right lower leg: No edema  Left lower leg: No edema  Skin:     General: Skin is warm and dry  Findings: Abscess present  No rash  Comments: +Numerous track marks on BUE/BLE  See additional images of abscesses below  Neurological:      General: No focal deficit present  Mental Status: She is alert and oriented to person, place, and time  GCS: GCS eye subscore is 4  GCS verbal subscore is 5  GCS motor subscore is 6  Cranial Nerves: No dysarthria or facial asymmetry  Sensory: Sensation is intact  No sensory deficit  Motor: No weakness or tremor  Coordination: Coordination normal       Comments: No BUE intention tremor   Psychiatric:         Attention and Perception: Attention normal  She does not perceive auditory or visual hallucinations  Mood and Affect: Mood is anxious (mild)  Speech: Speech normal          Behavior: Behavior is cooperative  Thought Content: Thought content does not include homicidal or suicidal ideation  Thought content does not include homicidal or suicidal plan         R axilla abscess  Document Information    Clinical Image - Mobile Device      02/17/2023 05:59   Attached To: Hospital Encounter on 2/17/23     Source Information    [address] [facility] 5t Inpatient Detox          L lateral thigh abscess  Document Information    Clinical Image - Mobile Device      02/17/2023 05:56   Attached To: Hospital Encounter on 2/17/23     Source Information    [address] PA-C   5t Inpatient Detox             DATA    EKG, Pathology, and Other Studies: I have personally reviewed pertinent reports      EKG: Sinus tachycardia at 108 bpm, no acute ST segment/T wave changes, normal axis and intervals  QTc 459  Lab Results: I have personally reviewed pertinent reports          CBC ETOH     Lab Results   Component Value Date    WBC 11 17 (H) 02/17/2023    RBC 4 76 02/17/2023    HGB 14 7 02/17/2023    HCT 44 7 02/17/2023    MCV 94 02/17/2023    MCH 30 9 02/17/2023    MCHC 32 9 02/17/2023    RDW 13 2 02/17/2023     02/17/2023    MPV 9 3 02/17/2023      No results found for: LACTICACID   CMP UA         Component Value Date/Time    K 3 7 02/17/2023 0148     02/17/2023 0148    CO2 27 02/17/2023 0148    BUN 14 02/17/2023 0148    CREATININE 0 97 02/17/2023 0148         Component Value Date/Time    CALCIUM 9 4 02/17/2023 0148    ALKPHOS 89 02/17/2023 0148    AST 72 (H) 02/17/2023 0148    ALT 81 (H) 02/17/2023 0148      No results found for: CLARITYU, COLORU, SPECGRAV, PHUR, GLUCOSEU, KETONESU, BLOODU, PROTEIN UA, NITRITE, BILIRUBINUR, UROBILINOGEN, LEUKOCYTESUR, WBCUA, RBCUA, HYALINE, BACTERIA, EPIS     Liver Function Test: ASA     Lab Results   Component Value Date    TBILI 0 60 02/17/2023    ALKPHOS 89 02/17/2023    AST 72 (H) 02/17/2023    ALT 81 (H) 02/17/2023    TP 8 6 (H) 02/17/2023    ALB 4 1 02/17/2023      No results found for: SALICYLATE   Troponin APAP     No results found for: TROPONINI   No results found for: ACTMNPHEN   VBG HCG     No results found for: PHVEN, FDD9KXY, PO2VEN, XID4NUA, BEVEN, N3IHYCFLS, B4IUHBJ   Lab Results   Component Value Date    HCGQUANT <0 6 10/22/2018      ABG Urine Drug Screen     No results found for: PHART, EIN4QRL, PO2ART, AFD2SIW, BEART, J7SIEFEOK, O2HGB, SOURC, BERNICE, VTAC, ACRATE, INSPIREDAIR, PEEP   Lab Results   Component Value Date    AMPMETHUR Positive (A) 02/17/2023    BARBTUR Negative 02/17/2023    BDZUR Positive (A) 02/17/2023    COCAINEUR Negative 02/17/2023    METHADONEUR Negative 02/17/2023    OPIATEUR Negative 02/17/2023    PCPUR Negative 02/17/2023    THCUR Negative 02/17/2023    OXYCODONEUR Negative 02/17/2023      Lactate INR     No results found for: LACTICACID   No results found for: INR   PTT Protime     No results found for: PTT   No results found for: PROTIME   Hepatitis HIV     Lab Results   Component Value Date    HEPBSAG Nonreactive (NR 06/25/2015    HEPCAB High Reactive ( (A) 06/25/2015      Lab Results   Component Value Date    [id number] Non-Reactive 02/17/2023    [id number] Non-Reactive 02/17/2023            Imaging Studies: I have personally reviewed pertinent reports  Counseling / Coordination of Care  Total floor / unit time spent today 50 minutes  Greater than 50% of total time was spent with the patient and / or family counseling and / or coordination of care  ** Please Note: This note has been constructed using a voice recognition system   **

## 2023-02-17 NOTE — ASSESSMENT & PLAN NOTE
· Patient with a history of chronic benzodiazepine use   ·   · Initiate [facility] protocol for medical management of benzodiazepine withdrawal  · Current benzo withdrawal signs/symptoms include **  · Received ** mg of phenobarbital as initial dose  · Continue monitoring under protocol and administer phenobarbital as indicated  · Continuous pulse ox and telemetry monitoring  · Encourage continued cessation of benzodiazepine use after withdrawal is fully treated

## 2023-02-17 NOTE — Clinical Note
Case was discussed with [facility] and the patient's admission status was agreed to be Admission Status: inpatient status to the service of Dr Susannah Marina

## 2023-02-17 NOTE — PROGRESS NOTES
02/17/23 1223   Referral Data   Referral Source Other (Comment)   Referral Name [facility]   Referral Reason Drug/Alcohol [facility] [place]   Readmission Root Cause   30 Day Readmission No   Patient Information   Mental Status Alert   Primary Caregiver Self   Support System Immediate family   Legal Information   Legal Issues Denies   Activities of Daily Living Prior to Admission   Functional Status Independent   Assistive Device No device needed   Living Arrangement Lives with someone  (Pt was residing with her mother on admission)   Ambulation Independent   Access to Firearms   Access to Firearms No   [address] Unemployed   [facility] of Transport to [facility] [place]: Family transport       Worker completed assessment  Worker explained role of case management  Worker confirmed name, address and phone number  Pt presented to [facility] ED with her sister requesting detox, transferred to 5T  Pt reports she has been using 2 5 bundles of fentanyl daily via IV and snorting  Pt reports first age of use 26, struggling on and off for many years  Pt reports a hx of overdoses  Pt denies any previous outpatient MAT  Pt reports longest period of clean time was 4 1/2 years  Pt reports using Xanax   5mg pill, 5-7 times per week  Pt reports first age of use 24  Pt reports using Xanax on and off  Pt reports using meth 1 gram daily via snorting, smoking or IV  Pt reports first age of use 33  Pt denies any other street drugs  Pt reports previous inpatient rehab at [facility] a few years ago and reports she went to a Recovery House after completing rehab  Pt at this time reports she plans to go to a recovery house in [facility] as her sister is the   Pt reports her sister is in recovery from addiction  Pt in agreement with outpatient MAT provider, signed LISETH for [facility]    Worker contacted [facility] to schedule intake for 3/16 at 10:00am      AUDIT: 1  PAWSS: 1  UDS: benzos/meth  Alcohol: <10    Pt is currently single, has 1 son that is currently in the custody of her mother  Pt reports she was residing with her mother prior to admission at [facility], [address] [place] [address]  Pt is currently unemployed  Pt's highest level of education is 11th grade  Pt denies any legal issues  Pt denies any inpatient psychiatric hospitalizations  Pt denies any outpatient psychiatric provider  Pt denies any suicide attempts  Pt does report a diagnosis of PTSD, reports abuse hx, did not provide specifics  Pt did not report any medical issues, reports having no current PCP  Pt reports having health insurance of [employer], signed LISETH's for both  Pt signed LISETH for her sister Kaity Bush, worker attempted to contact pt's sister at [phone] regarding pt but wrong number  Worker attempted to speak with pt regarding a different contact number but pt was sound asleep and could not be woken up  Relapse prevention plan was completed  Pt was able to minimally identify her warning signs  Pt reported coping skills  Pt reports her sister is her primary support  Clinical impression, pt was calm and cooperative throughout assessment  Pt did appear to have completely different presentation during assessment versus previous encounter with pt as she was difficult to keep awake earlier without receiving any medication  It is unknown if pt used any substances while on the unit but earlier presentation would suggest so  Pt did appear honest regarding her current use pattern  Pt would benefit from inpatient rehab to address warning signs as well as learn additional coping skills  Pt would benefit from going to a recovery house after completion of rehab, pt at this time just interested in recovery house  Pt has been to a recovery house previously  Pt at this time is in the contemplation stage of change

## 2023-02-17 NOTE — ASSESSMENT & PLAN NOTE
· Pt with a h/o PUD  · Was previously on Zantac prior to product recall, no current home medications  · Initiate daily PPI

## 2023-02-17 NOTE — PLAN OF CARE
Problem: SUBSTANCE USE/ABUSE  Goal: By discharge, will develop insight into their chemical dependency and sustain motivation to continue in recovery  Description: INTERVENTIONS:  - Attends all daily group sessions and scheduled AA groups  - Actively practices coping skills through participation in the therapeutic community and adherence to program rules  - Reviews and completes assignments from individual treatment plan  - Assist patient development of understanding of their personal cycle of addiction and relapse triggers  Outcome: Progressing  Goal: By discharge, patient will have ongoing treatment plan addressing chemical dependency  Description: INTERVENTIONS:  - Assist patient with resources and/or appointments for ongoing recovery based living  Outcome: Progressing     Problem: COPING  Goal: Pt/Family able to verbalize concerns and demonstrate effective coping strategies  Description: INTERVENTIONS:  - Assist patient/family to identify coping skills, available support systems and cultural and spiritual values  - Provide emotional support, including active listening and acknowledgement of concerns of patient and caregivers  - Reduce environmental stimuli, as able  - Provide patient education  - Assess for spiritual pain/suffering and initiate spiritual care, including notification of Pastoral Care or hilda based community as needed  - Assess effectiveness of coping strategies  Outcome: Progressing  Goal: Will report anxiety at manageable levels  Description: INTERVENTIONS:  - Administer medication as ordered  - Teach and encourage coping skills  - Provide emotional support  - Assess patient/family for anxiety and ability to cope  Outcome: Progressing

## 2023-02-17 NOTE — ASSESSMENT & PLAN NOTE
· Polysubstance abuse with daily fentanyl and methamphetamine, almost daily low-dose Xanax, and intermittent marijuana/cocaine use  · UDS + for methamphetamine, benzos  · Encourage cessation of all substance use

## 2023-02-17 NOTE — ED NOTES
Patient stated to caregiver that she lied about the last time she "used " Stated she used in the car on the way down here but did not want to say it to the triage person because she didn't want her mother to know       Neli Hopkins RN  02/17/23 1767

## 2023-02-17 NOTE — ASSESSMENT & PLAN NOTE
· Elevated LFTs on admission: AST 72, ALT 81  · Possibly 2/2 impaired liver function in the setting of IVDU  · Pt denies abd pain  · Acute hepatitis panel pending  · Continue monitoring CMP  · Encourage cessation of IVDU

## 2023-02-17 NOTE — ASSESSMENT & PLAN NOTE
· Patient with a history of chronic IV fentanyl use  · Uses 1/2 brick (about 2 5 bundles) daily   · PDMP reviewed: 1 prescription for Suboxone   · Management of opioid withdrawal under MAT protocol as above  · Screen for hepatitis/HIV with h/o IVDU  · Case management consulted for assistance with rehab resources - pt expresses interest in entering a recovery house after d/c

## 2023-02-17 NOTE — DISCHARGE INSTR - OTHER ORDERS
Outpatient Drug & Alcohol Treatment   The [facility] currently operates an outpatient treatment unit:  [place] in [place], [place] as a functional unit of the [facility]  The Outpatient treatment units are licensed by the [place] Department of Drug & Alcohol Programs to provide individual and group counseling for those with substance abuse and dependency problems  The clinical staff is experienced in a variety of therapeutic modalities and provides treatment that is individualized to meet the particular needs of each person  These units are drug-free treatment programs  The [facility] accepts most major healthcare insurance coverage plans, [place] Medical Assistance and in those cases where the consumer has no third party healthcare coverage, a liberal sliding fee schedule is utilized  The length of service and type of outpatient service provided is based on the results of the Level of Care Assessment            There are currently three treatment protocols available: Outpatient  Intensive Outpatient  Contracted services for Partial Hospitalization  Therapy is provided in both Individual and Group counseling formats  The Outpatient department offers individual counseling for the family members of substance abusers to address co-dependent and enabling behaviors      Outpatient treatment services in BEHAVIORAL MEDICINE AT [facility] are purchased through a fee-for-service subcontract with:  [place] [facility]  [address] [facility], [address]   Phone: [phone], [address] [facility] [address]  [facility], [address]   New Admissions [phone]  Local office [phone]    Outpatient treatment services in [facility] are purchased through fee-for-service subcontracts with:  [address]  12 [facility] [address]  Phone: [phone], [address]  Phone: [address] [facility] [phone] / [address] [phone]  Outpatient treatment services are also available to [facility] residents in our [facility]

## 2023-02-17 NOTE — ED PROVIDER NOTES
History  Chief Complaint   Patient presents with   • Detox Evaluation     Wants detox from fentany, benzos and zanax  Last use 3 hours ago  Was in rehab before  HPI    30 yo F, hx of polysubstance abuse presents to ed for detox  What drugs? Fentanyl/heroin and benzos  How much? 25 bags a day of fentanyl, 1-2 pills of benzos  How often? Every other day  What method (i e IV, smoke, etc )? Injects opioids, pills for benzos  History of withdrawal? yes  Alcohol use? denies    SI or HI? denies    Would patient like rehab? yes  Would patient like to be on Suboxone? yes    Any medical complaints? No     LMP Feb 3rd    None       Past Medical History:   Diagnosis Date   • Heroin abuse [facility])        Past Surgical History:   Procedure Laterality Date   • SC LAPS ABD PRTM&OMENTUM DX W/WO SPEC BR/WA SPX N/A 10/22/2018    Procedure: LAPAROSCOPY DIAGNOSTIC;  Surgeon: Viola Bullard MD;  Location: [address] [place] [address] OR;  Service: General       History reviewed  No pertinent family history  I have reviewed and agree with the history as documented  E-Cigarette/Vaping   • E-Cigarette Use Never User      E-Cigarette/Vaping Substances   • Nicotine No    • THC No    • CBD No    • Flavoring No    • Other No    • Unknown No      Social History     Tobacco Use   • Smoking status: Every Day     Packs/day: 0 50     Types: Cigarettes   • Smokeless tobacco: Never   Vaping Use   • Vaping Use: Never used   Substance Use Topics   • Alcohol use: Yes     Comment: socially   • Drug use: Yes     Types: Heroin, Benzodiazepines, Fentanyl, Other     Comment: about 5 times daily       Review of Systems   Constitutional: Negative for chills, fatigue and fever  HENT: Negative for sore throat  Eyes: Negative for redness and visual disturbance  Respiratory: Negative for cough and shortness of breath  Cardiovascular: Negative for chest pain  Gastrointestinal: Negative for abdominal pain, diarrhea and nausea     Genitourinary: Negative for difficulty urinating, dysuria and pelvic pain  Musculoskeletal: Negative for back pain  Skin: Negative for rash  Neurological: Negative for syncope, weakness and headaches  All other systems reviewed and are negative  Physical Exam  Physical Exam  Vitals and nursing note reviewed  Constitutional:       General: She is not in acute distress  HENT:      Head: Normocephalic and atraumatic  Eyes:      Conjunctiva/sclera: Conjunctivae normal    Cardiovascular:      Rate and Rhythm: Normal rate and regular rhythm  Heart sounds: Normal heart sounds  Pulmonary:      Effort: Pulmonary effort is normal  No respiratory distress  Breath sounds: Normal breath sounds  Abdominal:      General: Bowel sounds are normal       Palpations: Abdomen is soft  Tenderness: There is no abdominal tenderness  Musculoskeletal:         General: Normal range of motion  Cervical back: Normal range of motion  Skin:     General: Skin is warm and dry  Findings: No rash  Comments: Multiple track marks on upper extremities   Neurological:      Mental Status: She is alert and oriented to person, place, and time  Cranial Nerves: No cranial nerve deficit  Sensory: No sensory deficit  Motor: No abnormal muscle tone        Coordination: Coordination normal          Vital Signs  ED Triage Vitals   Temperature Pulse Respirations Blood Pressure SpO2   02/17/23 0008 02/17/23 0008 02/17/23 0008 02/17/23 0008 02/17/23 0008   98 5 °F (36 9 °C) (!) 120 16 143/92 98 %      Temp Source Heart Rate Source Patient Position - Orthostatic VS BP Location FiO2 (%)   02/17/23 0008 02/17/23 0008 02/17/23 0008 02/17/23 0008 --   Oral Monitor Sitting Left arm       Pain Score       02/17/23 0100       No Pain           Vitals:    02/17/23 0008 02/17/23 0311   BP: 143/92 124/76   Pulse: (!) 120 99   Patient Position - Orthostatic VS: Sitting Sitting         Visual Acuity      ED Medications  Medications acetaminophen (TYLENOL) tablet 650 mg (has no administration in time range)   nicotine (NICODERM CQ) 14 mg/24hr TD 24 hr patch 1 patch (has no administration in time range)   multivitamin-minerals (CENTRUM) tablet 1 tablet (has no administration in time range)   enoxaparin (LOVENOX) subcutaneous injection 40 mg (has no administration in time range)   transdermal buprenorphine (BUTRANS) 20 mcg/hr TD patch 20 mcg (has no administration in time range)       Diagnostic Studies  Results Reviewed     Procedure Component Value Units Date/Time    Hepatitis panel, acute [[id number]]     Lab Status: No result Specimen: Blood     Rapid HIV 1/2 AB-AG Combo for 15 yr old and above [[id number]]     Lab Status: No result Specimen: Blood     Comprehensive metabolic panel [[id number]]  (Abnormal) Collected: 02/17/23 0148    Lab Status: Final result Specimen: Blood from Arm, Right Updated: 02/17/23 0209     Sodium 138 mmol/L      Potassium 3 7 mmol/L      Chloride 103 mmol/L      CO2 27 mmol/L      ANION GAP 8 mmol/L      BUN 14 mg/dL      Creatinine 0 97 mg/dL      Glucose 101 mg/dL      Calcium 9 4 mg/dL      AST 72 U/L      ALT 81 U/L      Alkaline Phosphatase 89 U/L      Total Protein 8 6 g/dL      Albumin 4 1 g/dL      Total Bilirubin 0 60 mg/dL      eGFR 77 ml/min/1 73sq m     Narrative:      Sandhya guidelines for Chronic Kidney Disease (CKD):   •  Stage 1 with normal or high GFR (GFR > 90 mL/min/1 73 square meters)  •  Stage 2 Mild CKD (GFR = 60-89 mL/min/1 73 square meters)  •  Stage 3A Moderate CKD (GFR = 45-59 mL/min/1 73 square meters)  •  Stage 3B Moderate CKD (GFR = 30-44 mL/min/1 73 square meters)  •  Stage 4 Severe CKD (GFR = 15-29 mL/min/1 73 square meters)  •  Stage 5 End Stage CKD (GFR <15 mL/min/1 73 square meters)  Note: GFR calculation is accurate only with a steady state creatinine    Ethanol [[id number]]  (Normal) Collected: 02/17/23 0148    Lab Status: Final result Specimen: Blood from Arm, Right Updated: 02/17/23 0209     Ethanol Lvl <10 mg/dL     Magnesium [[id number]]  (Normal) Collected: 02/17/23 0148    Lab Status: Final result Specimen: Blood from Arm, Right Updated: 02/17/23 0208     Magnesium 2 0 mg/dL     CBC and differential [[id number]]  (Abnormal) Collected: 02/17/23 0148    Lab Status: Final result Specimen: Blood from Line, Venous Updated: 02/17/23 0157     WBC 11 17 Thousand/uL      RBC 4 76 Million/uL      Hemoglobin 14 7 g/dL      Hematocrit 44 7 %      MCV 94 fL      MCH 30 9 pg      MCHC 32 9 g/dL      RDW 13 2 %      MPV 9 3 fL      Platelets 505 Thousands/uL      nRBC 0 /100 WBCs      Neutrophils Relative 61 %      Immat GRANS % 0 %      Lymphocytes Relative 28 %      Monocytes Relative 7 %      Eosinophils Relative 3 %      Basophils Relative 1 %      Neutrophils Absolute 6 83 Thousands/µL      Immature Grans Absolute 0 02 Thousand/uL      Lymphocytes Absolute 3 08 Thousands/µL      Monocytes Absolute 0 79 Thousand/µL      Eosinophils Absolute 0 38 Thousand/µL      Basophils Absolute 0 07 Thousands/µL     Rapid drug screen, urine [[id number]]  (Abnormal) Collected: 02/17/23 0101    Lab Status: Final result Specimen: Urine, Clean Catch Updated: 02/17/23 0144     Amph/Meth UR Positive     Barbiturate Ur Negative     Benzodiazepine Urine Positive     Cocaine Urine Negative     Methadone Urine Negative     Opiate Urine Negative     PCP Ur Negative     THC Urine Negative     Oxycodone Urine Negative    Narrative:      Presumptive report  If requested, specimen will be sent to reference lab for confirmation  FOR MEDICAL PURPOSES ONLY  IF CONFIRMATION NEEDED PLEASE CONTACT THE LAB WITHIN 5 DAYS      Drug Screen Cutoff Levels:  AMPHETAMINE/METHAMPHETAMINES  1000 ng/mL  BARBITURATES     200 ng/mL  BENZODIAZEPINES     200 ng/mL  COCAINE      300 ng/mL  METHADONE      300 ng/mL  OPIATES      300 ng/mL  PHENCYCLIDINE     25 ng/mL  THC       50 ng/mL  OXYCODONE      100 ng/mL    POCT pregnancy, urine [[id number]]  (Normal) Resulted: 02/17/23 0057    Lab Status: Final result Updated: 02/17/23 0057     EXT Preg Test, Ur Negative     Control Valid                 No orders to display              Procedures  Procedures         ED Course  ED Course as of 02/17/23 0409   Fri Feb 17, 2023   0122 Dr Micaela Chaparro will be accepting once labs are back   0206 AMPH/METH(!): Positive   0206 BENZODIAZEPINE URINE(!): Positive       MDM     Clinical lab tests: ordered for detox  Reviewed past medical records: yes, no recent detox admissions     History Provided by patient     Differential considered: concern for going into opiate / drug withdrawal    Required testing for admission to detox unit: cbc cmp mag ekg uds ethanol  Discussed with tox, admitted to their service of for further management  Disposition  Final diagnoses:   Heroin abuse ([facility] [facility] )   [facility] ([facility] )   Methamphetamine abuse ([facility] )     Time reflects when diagnosis was documented in both MDM as applicable and the Disposition within this note     Time User Action Codes Description Comment    2/17/2023  2:06 AM Bretta Norse Add [F11 10] Heroin abuse ([facility] )     2/17/2023  2:06 AM Bretta Norse Add [F13 10] Benzodiazepine abuse ([facility] )     2/17/2023  2:06 AM Bretta Norse Add [F15 10] Methamphetamine abuse [facility])       ED Disposition     ED Disposition   Admit    Condition   Stable    Date/Time   Fri Feb 17, 2023  2:16 AM    Comment   Case was discussed with Tox and the patient's admission status was agreed to be Admission Status: inpatient status to the service of Dr Micaela Chaparro  Follow-up Information    None         Patient's Medications    No medications on file       No discharge procedures on file      PDMP Review       Value Time User    PDMP Reviewed  Yes 2/17/2023  3:01 AM Lety Weber PA-C          ED Provider  Electronically Signed by           Alexei Michael MD  02/17/23 3285

## 2023-02-17 NOTE — CASE MANAGEMENT
Worker attempted to complete assessment, pt was found in her room sleeping, difficult to wake up and keep awake  Worker will attempt to complete assessment later this afternoon

## 2023-02-17 NOTE — ASSESSMENT & PLAN NOTE
· Patient with a history of chronic opioid use  · Last use was yesterday 2/16 around 11 PM  · Initiate COWS protocol with plan for eventual microinduction with Suboxone  · Currently mild opioid withdrawal s/sx of anxiety, LE myalgias, rhinorrhea  · Initiate Butrans 20 mcg/hr patch   · Continue monitoring opioid withdrawal, and plan for low-dose buprenorphine microinduction later tonight versus tomorrow based on pt's symptom progression  · Symptomatic and supportive care  · Continuous pulse oximetry monitoring

## 2023-02-17 NOTE — ASSESSMENT & PLAN NOTE
· Abscess of lateral L thigh from pt injecting IV drugs, +erythema, induration, point TTP around 5 o'clock position, and necrotic tissue in the center  · U/S soft tissue LLE pending  · +Mild Leukocytosis on CBC, pt afebrile, VSS  · Initiate ABX with Keflex and Bactrim for MRSA coverage  · Continue monitoring

## 2023-02-17 NOTE — ED NOTES
Pt given turkey sandwich, pretzels and fruit cup     Rigo Bernard, [facility]  02/17/23 1783

## 2023-02-17 NOTE — ASSESSMENT & PLAN NOTE
· Patient with a h/o chronic benzodiazepine use x1 year to help with sleep and ameliorate opioid withdrawal sx  · Using Xanax 0 5 mg PO 5-7 times per week  · Denies H/o benzo withdrawal or w/d seizures  · Last use 2 nights ago, 2/15/23  · Given limited quantity/frequency of pt's benzo use and low risk for developing significant w/d, will monitor pt off SEWS protocol at this time  · Encouraged cessation of benzodiazepine use

## 2023-02-17 NOTE — ASSESSMENT & PLAN NOTE
WBC mildly elevated at 11 17 on admission  Possibly 2/2 leukemoid reaction from acute withdrawal  No evidence of active infection, pt afebrile and VSS  Continue monitoring CBC

## 2023-02-18 PROBLEM — D72.829 LEUKOCYTOSIS: Status: RESOLVED | Noted: 2018-10-22 | Resolved: 2023-02-18

## 2023-02-18 LAB
ALBUMIN SERPL BCP-MCNC: 3.2 G/DL (ref 3.5–5)
ALP SERPL-CCNC: 103 U/L (ref 43–122)
ALT SERPL W P-5'-P-CCNC: 105 U/L
ANION GAP SERPL CALCULATED.3IONS-SCNC: 4 MMOL/L (ref 5–14)
AST SERPL W P-5'-P-CCNC: 128 U/L (ref 14–36)
BILIRUB SERPL-MCNC: 0.35 MG/DL (ref 0.2–1)
BUN SERPL-MCNC: 12 MG/DL (ref 5–25)
CALCIUM ALBUM COR SERPL-MCNC: 8.9 MG/DL (ref 8.3–10.1)
CALCIUM SERPL-MCNC: 8.3 MG/DL (ref 8.4–10.2)
CHLORIDE SERPL-SCNC: 105 MMOL/L (ref 96–108)
CO2 SERPL-SCNC: 26 MMOL/L (ref 21–32)
CREAT SERPL-MCNC: 0.63 MG/DL (ref 0.6–1.2)
ERYTHROCYTE [DISTWIDTH] IN BLOOD BY AUTOMATED COUNT: 12.8 % (ref 11.6–15.1)
GFR SERPL CREATININE-BSD FRML MDRD: 119 ML/MIN/1.73SQ M
GLUCOSE SERPL-MCNC: 103 MG/DL (ref 70–99)
HCT VFR BLD AUTO: 39.8 % (ref 34.8–46.1)
HGB BLD-MCNC: 13.8 G/DL (ref 11.5–15.4)
MAGNESIUM SERPL-MCNC: 2 MG/DL (ref 1.6–2.3)
MCH RBC QN AUTO: 31.8 PG (ref 26.8–34.3)
MCHC RBC AUTO-ENTMCNC: 34.7 G/DL (ref 31.4–37.4)
MCV RBC AUTO: 92 FL (ref 82–98)
PLATELET # BLD AUTO: 229 THOUSANDS/UL (ref 149–390)
PMV BLD AUTO: 10.1 FL (ref 8.9–12.7)
POTASSIUM SERPL-SCNC: 4.1 MMOL/L (ref 3.5–5.3)
PROT SERPL-MCNC: 6.8 G/DL (ref 6.4–8.4)
RBC # BLD AUTO: 4.34 MILLION/UL (ref 3.81–5.12)
SODIUM SERPL-SCNC: 135 MMOL/L (ref 135–147)
VANCOMYCIN SERPL-MCNC: 11.7 UG/ML (ref 10–20)
WBC # BLD AUTO: 5.16 THOUSAND/UL (ref 4.31–10.16)

## 2023-02-18 RX ORDER — LANOLIN ALCOHOL/MO/W.PET/CERES
6 CREAM (GRAM) TOPICAL
Status: DISCONTINUED | OUTPATIENT
Start: 2023-02-18 | End: 2023-02-19 | Stop reason: HOSPADM

## 2023-02-18 RX ORDER — BUPRENORPHINE AND NALOXONE 2; .5 MG/1; MG/1
4 FILM, SOLUBLE BUCCAL; SUBLINGUAL ONCE
Status: COMPLETED | OUTPATIENT
Start: 2023-02-19 | End: 2023-02-19

## 2023-02-18 RX ORDER — HYDROXYZINE HYDROCHLORIDE 25 MG/1
25 TABLET, FILM COATED ORAL EVERY 6 HOURS PRN
Status: DISCONTINUED | OUTPATIENT
Start: 2023-02-18 | End: 2023-02-19 | Stop reason: HOSPADM

## 2023-02-18 RX ORDER — BUPRENORPHINE AND NALOXONE 2; .5 MG/1; MG/1
2 FILM, SOLUBLE BUCCAL; SUBLINGUAL
Status: DISCONTINUED | OUTPATIENT
Start: 2023-02-18 | End: 2023-02-18

## 2023-02-18 RX ORDER — BUPRENORPHINE 2 MG/1
0.5 TABLET SUBLINGUAL
Status: COMPLETED | OUTPATIENT
Start: 2023-02-18 | End: 2023-02-18

## 2023-02-18 RX ORDER — BUPRENORPHINE AND NALOXONE 8; 2 MG/1; MG/1
8 FILM, SOLUBLE BUCCAL; SUBLINGUAL 2 TIMES DAILY
Status: DISCONTINUED | OUTPATIENT
Start: 2023-02-19 | End: 2023-02-19 | Stop reason: HOSPADM

## 2023-02-18 RX ADMIN — CLONIDINE HYDROCHLORIDE 0.1 MG: 0.1 TABLET ORAL at 21:31

## 2023-02-18 RX ADMIN — VANCOMYCIN HYDROCHLORIDE 750 MG: 750 INJECTION, SOLUTION INTRAVENOUS at 12:10

## 2023-02-18 RX ADMIN — GABAPENTIN 300 MG: 300 CAPSULE ORAL at 22:11

## 2023-02-18 RX ADMIN — HYDROXYZINE HYDROCHLORIDE 25 MG: 25 TABLET ORAL at 23:15

## 2023-02-18 RX ADMIN — BUPRENORPHINE AND NALOXONE 2 MG: 2; .5 FILM BUCCAL; SUBLINGUAL at 20:06

## 2023-02-18 RX ADMIN — Medication 0.5 MG: at 12:09

## 2023-02-18 RX ADMIN — Medication 0.5 MG: at 16:21

## 2023-02-18 RX ADMIN — GABAPENTIN 300 MG: 300 CAPSULE ORAL at 09:39

## 2023-02-18 RX ADMIN — BUPRENORPHINE AND NALOXONE 2 MG: 2; .5 FILM BUCCAL; SUBLINGUAL at 22:11

## 2023-02-18 RX ADMIN — Medication 0.5 MG: at 14:10

## 2023-02-18 RX ADMIN — Medication 0.5 MG: at 18:08

## 2023-02-18 RX ADMIN — SULFAMETHOXAZOLE AND TRIMETHOPRIM 1 TABLET: 800; 160 TABLET ORAL at 08:50

## 2023-02-18 RX ADMIN — CLONAZEPAM 1 MG: 1 TABLET ORAL at 18:14

## 2023-02-18 RX ADMIN — ENOXAPARIN SODIUM 40 MG: 40 INJECTION SUBCUTANEOUS at 08:51

## 2023-02-18 RX ADMIN — NICOTINE 1 PATCH: 14 PATCH, EXTENDED RELEASE TRANSDERMAL at 08:51

## 2023-02-18 RX ADMIN — MULTIPLE VITAMINS W/ MINERALS TAB 1 TABLET: TAB ORAL at 08:51

## 2023-02-18 RX ADMIN — VANCOMYCIN HYDROCHLORIDE 750 MG: 750 INJECTION, SOLUTION INTRAVENOUS at 00:09

## 2023-02-18 RX ADMIN — ACETAMINOPHEN 650 MG: 325 TABLET ORAL at 09:39

## 2023-02-18 RX ADMIN — PANTOPRAZOLE SODIUM 40 MG: 40 TABLET, DELAYED RELEASE ORAL at 05:37

## 2023-02-18 NOTE — ASSESSMENT & PLAN NOTE
· Opened spontaneously  No further intervention needed  Continue wound care  · There is a associated cellulitis  Improvement exhibited today  Transitioned to PO Bactrim and Keflex  Will continue upon discharge

## 2023-02-18 NOTE — ASSESSMENT & PLAN NOTE
· Pt with a h/o PUD  · Was previously on Zantac prior to product recall, no current home medications  · Continue daily PPI

## 2023-02-18 NOTE — ASSESSMENT & PLAN NOTE
· Case management involved for aftercare planning and linkage to ongoing [facility] treatment post discharge

## 2023-02-18 NOTE — ASSESSMENT & PLAN NOTE
· Pt also has an acutely inflamed abscess of R anterior axilla  · Denies injecting into her upper extremities  · Notes recurrent b/l axillary abscesses, at least 3 within the past 1 year, which have resolved spontaneously after self-expressing drainage  · Possibly 2/2 hidradenitis suppurativa  · Continue ABX as above- Keflex and Bactrim  · OP referral to dermatology on d/c

## 2023-02-18 NOTE — UTILIZATION REVIEW
Initial Clinical Review    Admission: Date/Time/Statement:   Admission Orders (From admission, onward)     Ordered        02/17/23 0215  INPATIENT ADMISSION  Once                      Orders Placed This Encounter   Procedures   • INPATIENT ADMISSION     Standing Status:   Standing     Number of Occurrences:   1     Order Specific Question:   Level of Care     Answer:   Med Surg [16]     Order Specific Question:   Estimated length of stay     Answer:   More than 2 Midnights     Order Specific Question:   Certification     Answer:   I certify that inpatient services are medically necessary for this patient for a duration of greater than two midnights  See H&P and MD Progress Notes for additional information about the patient's course of treatment  ED Arrival Information     Expected   -    Arrival   2/16/2023 23:31    Acuity   Urgent            Means of arrival   Walk-In    Escorted by   Jose Tijerina [facility] Toxicology    Admission type   Emergency            Arrival complaint   detox eval            Chief Complaint   Patient presents with   • Detox Evaluation     Wants detox from fentany, benzos and zanax  Last use 3 hours ago  Was in rehab before  Initial Presentation: 29 y o  female to the ED from home with complaints of detox eval, wanting to detox from fentanyl, benzos and xanax  Last use on way to ED  Admitted to inpatient medical detox unit for opioid withdrawal, opioid use disorder  H/O  OUD, benzodiazepine misuse, polysubstance abuse, and peptic ulcer disease   Arrives tachycardic, Clinical Opiate Withdrawal Scale Total Score: 2  Abscess note right axilla, left thigh both with erythema  Multiple track marks BU and LE  Start butrans  Monitor pulse ox  Last use of benzos was 2/15/23  MIld leukocytosis  Started on keflex, mrsa      Psychiatry  note:Calm cooperative  reports she has been using 2 5 bundles of fentanyl daily via IV and snorting    Pt reports first age of use 26, struggling on and off for many years  Pt reports a hx of overdoses  Pt denies any previous outpatient MAT  Pt reports longest period of clean time was 4 1/2 years  Pt reports using Xanax   5mg pill, 5-7 times per week  Pt reports first age of use 24  Pt reports using Xanax on and off  Pt reports using meth 1 gram daily via snorting, smoking or IV  Pt reports first age of use 33  AUDIT: 1  PAWSS: 1  UDS: benzos/meth  Alcohol: <10  Date: 2/18   Day 2:   About 36 hours since last opioid use  Continue abx  Expresses interest in entering recovery [place]     Clinical Opiate Withdrawal Scale Total Score: 2     SEWS Total Score: 0 (2/17/2023  5:00 AM)  Continue comfort meds and start 0 5mg buprenorphine dosing now with slow dose escalation over the next 24 hours         Temperature Pulse Respirations Blood Pressure SpO2   02/17/23 0008 02/17/23 0008 02/17/23 0008 02/17/23 0008 02/17/23 0008   98 5 °F (36 9 °C) (!) 120 16 143/92 98 %      Temp Source Heart Rate Source Patient Position - Orthostatic VS BP Location FiO2 (%)   02/17/23 0008 02/17/23 0008 02/17/23 0008 02/17/23 0008 --   Oral Monitor Sitting Left arm       Pain Score       02/17/23 0100       No Pain          Wt Readings from Last 1 Encounters:   02/17/23 62 1 kg (137 lb)     Additional Vital Signs:   Time Temp Pulse Resp BP MAP (mmHg) SpO2 O2 Device Patient Position - Orthostatic VS   02/18/23 0700 97 8 °F (36 6 °C) 74 16 99/54 69 100 % None (Room air) Lying   02/17/23 2040 97 °F (36 1 °C) Abnormal  74 16 108/70 82 96 % None (Room air) Sitting   02/17/23 1442 97 6 °F (36 4 °C) 67 14 95/53 -- 98 % None (Room air) Lying   02/17/23 1105 97 1 °F (36 2 °C) Abnormal  69 14 96/50 -- 97 % None (Room air) Lying   02/17/23 0707 97 9 °F (36 6 °C) 86 14 110/64 -- 98 % None (Room air) Lying   02/17/23 0454 98 5 °F (36 9 °C) 86 12 115/69 -- 96 % None (Room air) Sitting   02/17/23 0311 -- 99 12 124/76 -- 99 % None (Room air) Sitting   02/17/23 0008 98 5 °F (36 9 °C) 120 Abnormal  16 143/92 -- 98 % None (Room air) Sitting       Pertinent Labs/Diagnostic Test Results:   2/17 EKG: Sinus tachycardia  Normal ECG  No previous ECGs available  US extremity soft tissue   Final Result by Aylin Weiss MD (02/17 1894)      Tiny complex fluid collection at the site of clinical concern in the left thigh  This may reflect a tiny abscess or hematoma           Workstation performed: [id number]               Results from last 7 days   Lab Units 02/18/23  0929 02/17/23  0148   WBC Thousand/uL 5 16 11 17*   HEMOGLOBIN g/dL 13 8 14 7   HEMATOCRIT % 39 8 44 7   PLATELETS Thousands/uL 229 261   NEUTROS ABS Thousands/µL  --  6 83         Results from last 7 days   Lab Units 02/18/23  0929 02/17/23  0148   SODIUM mmol/L 135 138   POTASSIUM mmol/L 4 1 3 7   CHLORIDE mmol/L 105 103   CO2 mmol/L 26 27   ANION GAP mmol/L 4* 8   BUN mg/dL 12 14   CREATININE mg/dL 0 63 0 97   EGFR ml/min/1 73sq m 119 77   CALCIUM mg/dL 8 3* 9 4   MAGNESIUM mg/dL 2 0 2 0     Results from last 7 days   Lab Units 02/18/23  0929 02/17/23  0148   AST U/L 128* 72*   ALT U/L 105* 81*   ALK PHOS U/L 103 89   TOTAL PROTEIN g/dL 6 8 8 6*   ALBUMIN g/dL 3 2* 4 1   TOTAL BILIRUBIN mg/dL 0 35 0 60         Results from last 7 days   Lab Units 02/18/23  0929 02/17/23  0148   GLUCOSE RANDOM mg/dL 103* 101*       Results from last 7 days   Lab Units 02/17/23  0459   HEP B S AG  Non-reactive   HEP C AB  High Reactive*   HEP B C IGM  Non-reactive         Results from last 7 days   Lab Units 02/17/23  0101   AMPH/METH  Positive*   BARBITURATE UR  Negative   BENZODIAZEPINE UR  Positive*   COCAINE UR  Negative   METHADONE URINE  Negative   OPIATE UR  Negative   PCP UR  Negative   THC UR  Negative     Results from last 7 days   Lab Units 02/17/23  0148   ETHANOL LVL mg/dL <10         Past Medical History:   Diagnosis Date   • Heroin abuse ([facility] )          Admitting Diagnosis: Methamphetamine abuse ([place] [address] ) [F15 10]  Benzodiazepine abuse ([facility] ) [F13 10]  Heroin abuse ([facility] ) [F11 10]  Encounter for assessment of alcohol and drug use [Z76 89]  Age/Sex: 29 y o  female  Admission Orders:  Scheduled Medications:  buprenorphine, 0 5 mg, Sublingual, Q2H  buprenorphine-naloxone, 2 mg, Sublingual, Q2H  [START ON 2/19/2023] buprenorphine-naloxone, 8 mg, Sublingual, BID  enoxaparin, 40 mg, Subcutaneous, Daily  multivitamin-minerals, 1 tablet, Oral, Daily  nicotine, 1 patch, Transdermal, Daily  pantoprazole, 40 mg, Oral, Early Morning  transdermal buprenorphine, 20 mcg, Transdermal, Q7 Days  vancomycin, 750 mg, Intravenous, Q12H      Continuous IV Infusions:     PRN Meds:  acetaminophen, 650 mg, Oral, Q6H PRN  clonazePAM, 1 mg, Oral, Q6H PRN  cloNIDine, 0 1 mg, Oral, Q6H PRN  gabapentin, 300 mg, Oral, TID PRN  nicotine polacrilex, 2 mg, Oral, Q2H PRN  traZODone, 50 mg, Oral, HS PRN        IP CONSULT TO CASE MANAGEMENT  IP CONSULT TO PHARMACY    Network Utilization Review Department  ATTENTION: Please call with any questions or concerns to [phone] and carefully listen to the prompts so that you are directed to the right person  All voicemails are confidential   Rosy Horta all requests for admission clinical reviews, approved or denied determinations and any other requests to dedicated fax number below belonging to the campus where the patient is receiving treatment   List of dedicated fax numbers for the Facilities:  [address] DENIALS (Administrative/Medical Necessity) [phone]   [address] (Maternity/NICU/Pediatrics) [phone]   910 Mavis Tamayo [phone]   [facility] [phone]   [facility] [phone]   [address] 820 [facility] 271 [facility] [phone] [address] 645 [facility] [phone]

## 2023-02-18 NOTE — ASSESSMENT & PLAN NOTE
· Patient with a history of chronic IV fentanyl use  Uses 1/2 brick (about 2 5 bundles) daily   · Initial plan to continue maintenance Suboxone upon discharge however patient chose to pursue Vivitrol when transitioned to [address]     · Case management consulted for assistance with disposition planning - pt expresses interest in entering a recovery house and ongoing [address] after d/c

## 2023-02-18 NOTE — PROGRESS NOTES
Mike Perez is a 29 y o  female who is currently ordered Vancomycin IV with management by the Pharmacy Consult service  Relevant clinical data and objective / subjective history reviewed  Vancomycin Assessment:  Indication and Goal AUC/Trough: Soft tissue (goal -600, trough >10)  Clinical Status: improving  Micro: pending  Renal Function:  SCr: 0 63 mg/dL  CrCl: 118 4 mL/min  Renal replacement: Not on dialysis  Days of Therapy: 2  Current Dose: 750 mg IV q12  Vancomycin Plan:  New Dosin mg IV every 12 hours  Estimated AUC: 479 mcg*hr/mL  Estimated Trough: 13 5 mcg/mL  Next Level: 23 at 6 am   Renal Function Monitoring: Daily BMP and [place] will continue to follow closely for s/sx of nephrotoxicity, infusion reactions and appropriateness of therapy  BMP and CBC will be ordered per protocol  We will continue to follow the patient’s culture results and clinical progress daily      Vincent Jiménez, Pharmacist

## 2023-02-18 NOTE — UTILIZATION REVIEW
NOTIFICATION OF INPATIENT ADMISSION   AUTHORIZATION REQUEST   SERVICING FACILITY:   [address] [place] [address]  [address] , [facility], [address] [place] [address]  Tax ID: [id number]  NPI: [id number] ATTENDING PROVIDER:  Attending Name and NPI#: Yaakov Madsen Md [[id number]]  Address: [address] , [facility], [address] [place] [address]  Phone: [phone]     ADMISSION INFORMATION:  Place of Service: Inpatient [address] [facility]  [address]  Place of Service Code: 21  Inpatient Admission Date/Time: 2/17/23  2:07 AM  Discharge Date/Time: No discharge date for patient encounter  Admitting Diagnosis Code/Description:  Methamphetamine abuse ([facility] ) [F15 10]  Benzodiazepine abuse ([facility] ) [F13 10]  Heroin abuse ([facility] ) [F11 10]  Encounter for assessment of alcohol and drug use [Z76 89]     UTILIZATION REVIEW CONTACT:  Helen Bradshaw, Utilization   Network Utilization Review Department  Phone: [phone]  Fax [phone]  Email: [email or web address]  Contact for approvals/pending authorizations, clinical reviews, and discharge  PHYSICIAN ADVISORY SERVICES:  Medical Necessity Denial & Mdoc-ml-Yhaa Review  Phone: [phone]  Fax: [phone]  Email: [email or web address]

## 2023-02-18 NOTE — PROGRESS NOTES
PROGRESS NOTE  DEPARTMENT OF MEDICAL TOXICOLOGY  LEVEL 4 MEDICAL DETOX UNIT  Sophy Roberts 29 y o  female MRN: [id number]  Unit/Bed#: 5T DETOX 512-01 Encounter: [id number]      Reason for Admission/Principal Problem: Leg abscess and opioid withdrawal  Rounding Provider: Neri Luque DO  Attending Provider: Antony Benson MD   2/17/2023 12:14 AM           * Opioid withdrawal ([facility] )  Assessment & Plan  · Pt is about 36 hours since her last use  She endorses chills, aches, restlessness, congestion  · We will continue comfort meds and start 0 5mg buprenorphine dosing now with slow dose escalation over the next 24 hours  Abscess of left lower extremity  Assessment & Plan  · Opened spontaneously  No further intervention needed  Continue wound care  · There is a associated cellulitis  We will continue vancomycin today  Will monitor and change back to PO abx once improvement seen  Abscess of right axilla  Assessment & Plan  · Pt also has an acutely inflamed abscess of R anterior axilla  · Denies injecting into her upper extremities  · Notes recurrent b/l axillary abscesses, at least 3 within the past 1 year, which have resolved spontaneously after self-expressing drainage  · Possibly 2/2 hidradenitis suppurativa  · Initiate ABX as above- Keflex and Bactrim  · OP referral to dermatology on d/c    Tobacco use disorder  Assessment & Plan  · Pt reports smoking 0 5-1 ppd of cigarettes  · Offered and accepted NRT   · Encourage cessation      Polysubstance abuse ([facility] )  Assessment & Plan  · Case management involved for aftercare planning and linkage to ongoing [facility] treatment post discharge  Peptic ulcer disease  Assessment & Plan  · Pt with a h/o PUD  · Was previously on Zantac prior to product recall, no current home medications  · Initiate daily PPI    Transaminitis  Assessment & Plan  · Likely secondary to Hep C    · Will need outpatient follow up for further eval and definitive treatment       Opioid use disorder, severe, dependence ([facility] )  Assessment & Plan  · Patient with a history of chronic IV fentanyl use  · Uses 1/2 brick (about 2 5 bundles) daily   · PDMP reviewed: 1 prescription for Suboxone   · Management of opioid withdrawal under MAT protocol as above  · Screen for hepatitis/HIV with h/o IVDU  · Case management consulted for assistance with rehab resources - pt expresses interest in entering a recovery house and ongoing [address] after d/c    Benzodiazepine misuse  Assessment & Plan  · Pt has not developed evidence of withdrawal  Will continue to monitor  VTE Pharmacologic Prophylaxis:   Pharmacologic: Enoxaparin (Lovenox)  Mechanical VTE Prophylaxis in Place: no    Code Status: Level 1 - Full Code    Patient Centered Rounds: I have performed bedside rounds with nursing staff today  Discussions with Specialists or Other Care Team Provider: detox AP and nursing     Education and Discussions with Family / Patient: yes    Time Spent for Care: 15 minutes  More than 50% of total time spent on counseling and coordination of care as described above  Current Length of Stay: 1 day(s)    Current Patient Status: Inpatient     Certification Statement: The patient will continue to require additional inpatient hospital stay due to medically managed withdrawal syndrome and treatment of infection    Discharge Plan: outpatient for ongoing OUD treatment      Subjective:   Pt feels chills, congestion, restlessness    Objective:     Clinical Opiate Withdrawal Scale  Pulse: 74  Resting Pulse Rate: Measured After Patient is Sitting or Lying for One Minute: Pulse rate   GI Upset: Over Last Half Hour: No GI symptoms  Sweating: Over Past Half Hour Not Accounted for by Room Temperature of Patient Activity: No report of chills or flushing  Tremor: Observation of Outstretched Hands: No tremor  Restlessness: Observation During Assessment: Able to sit still  Yawning: Observation During Assessment: No yawning  Pupil Size: Pupils pinned or normal size for room light  Anxiety and Irritability: None  Bone or Joint Aches: If Patient was Having Pain Previously, Only the Additional Component Attributed to Opiate Withdrawal is Scored: Not present  Gooseflesh Skin: Skin is smooth  Runny Nose or Tearing: Not Accounted for by Cold Symptoms or Allergies: Nasal stuffiness of unusually moist eyes  Clinical Opiate Withdrawal Scale Total Score: 2    SEWS Total Score: 0 (2023  5:00 AM)        Last 24 Hours Medication List:   Current Facility-Administered Medications   Medication Dose Route Frequency Provider Last Rate   • acetaminophen  650 mg Oral Q6H PRN Meghan Emi Fabry, PA-C     • buprenorphine  0 5 mg Sublingual Q2H Lenoard Saris, DO     • buprenorphine-naloxone  2 mg Sublingual Q2H Lenoard Saris, DO     • [START ON 2023] buprenorphine-naloxone  8 mg Sublingual BID Lenoard Saris, DO     • clonazePAM  1 mg Oral Q6H PRN Meghan Emi Fabry, PA-C     • cloNIDine  0 1 mg Oral Q6H PRN Meghan Emi Fabry, PA-C     • enoxaparin  40 mg Subcutaneous Daily Meghan Emi Fabry, PA-C     • gabapentin  300 mg Oral TID PRN Char Friend PA-C     • multivitamin-minerals  1 tablet Oral Daily Meghan Emi Fabry, PA-C     • nicotine  1 patch Transdermal Daily Meghan Emi Fabry, PA-C     • nicotine polacrilex  2 mg Oral Q2H PRN Meghan Emi Fabry, PA-C     • pantoprazole  40 mg Oral Early Morning Meghan Emi Fabry, PA-C     • transdermal buprenorphine  20 mcg Transdermal Q7 Days Meghan Emi Fabry, PA-C     • traZODone  50 mg Oral HS PRN Meghan Emi Fabry, PA-C     • vancomycin  750 mg Intravenous Q12H Hazel Saunders  mg (23 1210)         Vitals:   Temp (24hrs), Av 5 °F (36 4 °C), Min:97 °F (36 1 °C), Max:97 8 °F (36 6 °C)    Temp:  [97 °F (36 1 °C)-97 8 °F (36 6 °C)] 97 8 °F (36 6 °C)  HR:  [67-74] 74  Resp:  [14-16] 16  BP: ()/(53-70) 99/54  SpO2:  [96 %-100 %] 100 %  Body mass index is 22 8 kg/m²  Input and Output Summary (last 24 hours):No intake or output data in the 24 hours ending 02/18/23 1358    Physical Exam:   Physical Exam  Cardiovascular:      Rate and Rhythm: Normal rate  Heart sounds: Normal heart sounds  Pulmonary:      Effort: Pulmonary effort is normal       Breath sounds: Normal breath sounds  Skin:     General: Skin is warm  Comments: L thigh with open abscess quarter sized  There is no fluctuance now  There is surrounding cellulitis of the thigh  Neurological:      Mental Status: She is alert and oriented to person, place, and time  Psychiatric:         Behavior: Behavior normal          Additional Data:     Labs:   Results from last 7 days   Lab Units 02/18/23  0929 02/17/23  0148   WBC Thousand/uL 5 16 11 17*   HEMOGLOBIN g/dL 13 8 14 7   HEMATOCRIT % 39 8 44 7   PLATELETS Thousands/uL 229 261   NEUTROS PCT %  --  61   LYMPHS PCT %  --  28   MONOS PCT %  --  7   EOS PCT %  --  3      Results from last 7 days   Lab Units 02/18/23  0929   SODIUM mmol/L 135   POTASSIUM mmol/L 4 1   CHLORIDE mmol/L 105   CO2 mmol/L 26   BUN mg/dL 12   CREATININE mg/dL 0 63   ANION GAP mmol/L 4*   CALCIUM mg/dL 8 3*   ALBUMIN g/dL 3 2*   TOTAL BILIRUBIN mg/dL 0 35   ALK PHOS U/L 103   ALT U/L 105*   AST U/L 128*   GLUCOSE RANDOM mg/dL 103*                              * I Have Reviewed All Lab Data Listed Above  * Additional Pertinent Lab Tests Reviewed: Kimberly 66 Admission Reviewed      Imaging Studies: I have personally reviewed pertinent reports  Recent Cultures (last 7 days): Today, Patient Was Seen By: Ruben Guajardo DO    ** Please Note: Dictation voice to text software may have been used in the creation of this document   **

## 2023-02-18 NOTE — ASSESSMENT & PLAN NOTE
· Likely secondary to Hep C    · Will need outpatient follow up for further eval and definitive treatment

## 2023-02-19 VITALS
RESPIRATION RATE: 18 BRPM | HEART RATE: 96 BPM | BODY MASS INDEX: 22.82 KG/M2 | SYSTOLIC BLOOD PRESSURE: 127 MMHG | WEIGHT: 137 LBS | OXYGEN SATURATION: 99 % | HEIGHT: 65 IN | TEMPERATURE: 97.9 F | DIASTOLIC BLOOD PRESSURE: 79 MMHG

## 2023-02-19 PROBLEM — F11.93 OPIOID WITHDRAWAL (HCC): Status: RESOLVED | Noted: 2023-02-17 | Resolved: 2023-02-19

## 2023-02-19 RX ORDER — BUPRENORPHINE AND NALOXONE 8; 2 MG/1; MG/1
8 FILM, SOLUBLE BUCCAL; SUBLINGUAL 2 TIMES DAILY
Qty: 60 FILM | Refills: 0 | Status: SHIPPED | OUTPATIENT
Start: 2023-02-19 | End: 2023-03-21

## 2023-02-19 RX ORDER — CEPHALEXIN 500 MG/1
500 CAPSULE ORAL EVERY 6 HOURS SCHEDULED
Status: DISCONTINUED | OUTPATIENT
Start: 2023-02-19 | End: 2023-02-19 | Stop reason: HOSPADM

## 2023-02-19 RX ORDER — TRAZODONE HYDROCHLORIDE 50 MG/1
50 TABLET ORAL
Qty: 30 TABLET | Refills: 0 | Status: SHIPPED | OUTPATIENT
Start: 2023-02-19 | End: 2023-03-21

## 2023-02-19 RX ORDER — CEPHALEXIN 500 MG/1
500 CAPSULE ORAL EVERY 6 HOURS SCHEDULED
Qty: 19 CAPSULE | Refills: 0 | Status: SHIPPED | OUTPATIENT
Start: 2023-02-19 | End: 2023-02-24

## 2023-02-19 RX ORDER — HYDROXYZINE HYDROCHLORIDE 25 MG/1
25 TABLET, FILM COATED ORAL EVERY 6 HOURS PRN
Qty: 45 TABLET | Refills: 0 | Status: SHIPPED | OUTPATIENT
Start: 2023-02-19

## 2023-02-19 RX ORDER — SULFAMETHOXAZOLE AND TRIMETHOPRIM 800; 160 MG/1; MG/1
1 TABLET ORAL EVERY 12 HOURS SCHEDULED
Qty: 9 TABLET | Refills: 0 | Status: SHIPPED | OUTPATIENT
Start: 2023-02-19 | End: 2023-02-24

## 2023-02-19 RX ORDER — PANTOPRAZOLE SODIUM 40 MG/1
40 TABLET, DELAYED RELEASE ORAL
Qty: 30 TABLET | Refills: 0 | Status: SHIPPED | OUTPATIENT
Start: 2023-02-20 | End: 2023-03-22

## 2023-02-19 RX ORDER — SULFAMETHOXAZOLE AND TRIMETHOPRIM 800; 160 MG/1; MG/1
1 TABLET ORAL EVERY 12 HOURS SCHEDULED
Status: DISCONTINUED | OUTPATIENT
Start: 2023-02-19 | End: 2023-02-19 | Stop reason: HOSPADM

## 2023-02-19 RX ADMIN — PANTOPRAZOLE SODIUM 40 MG: 40 TABLET, DELAYED RELEASE ORAL at 08:29

## 2023-02-19 RX ADMIN — CEPHALEXIN 500 MG: 500 CAPSULE ORAL at 08:28

## 2023-02-19 RX ADMIN — SULFAMETHOXAZOLE AND TRIMETHOPRIM 1 TABLET: 800; 160 TABLET ORAL at 08:28

## 2023-02-19 RX ADMIN — MULTIPLE VITAMINS W/ MINERALS TAB 1 TABLET: TAB ORAL at 08:28

## 2023-02-19 RX ADMIN — Medication 6 MG: at 00:05

## 2023-02-19 RX ADMIN — NICOTINE 1 PATCH: 14 PATCH, EXTENDED RELEASE TRANSDERMAL at 08:27

## 2023-02-19 RX ADMIN — BUPRENORPHINE AND NALOXONE 8 MG: 8; 2 FILM BUCCAL; SUBLINGUAL at 08:28

## 2023-02-19 RX ADMIN — TRAZODONE HYDROCHLORIDE 50 MG: 50 TABLET ORAL at 00:01

## 2023-02-19 RX ADMIN — BUPRENORPHINE AND NALOXONE 4 MG: 2; .5 FILM BUCCAL; SUBLINGUAL at 00:00

## 2023-02-19 NOTE — DISCHARGE SUMMARY
MEDICAL DETOX UNIT, LEVEL 4  Department of Medical Toxicology  Reason for Admission/Principal Problem: Opioid withdrawal, opioid use disorder  Admitting provider: ROZ Fish MD   2/17/2023 12:14 AM       Discharging Physician / Practitioner: ROZ Fish  PCP: No primary care provider on file  Admission Date:   Admission Orders (From admission, onward)     Ordered        02/17/23 0215  INPATIENT ADMISSION  Once                      Discharge Date: 02/19/23    Medical Problems     Resolved Problems  Date Reviewed: 2/17/2023          Resolved    Leukocytosis 2/18/2023     Resolved by  Maulik Dang DO    Opioid withdrawal ([facility] ) 2/19/2023     Resolved by  ROZ Fish          * Opioid use disorder, severe, dependence ([facility] [address] )  Assessment & Plan  · Patient with a history of chronic IV fentanyl use  Uses 1/2 brick (about 2 5 bundles) daily   · Initial plan to continue maintenance Suboxone upon discharge however patient chose to pursue Vivitrol when transitioned to [address]  · Case management consulted for assistance with disposition planning - pt expresses interest in entering a recovery house and ongoing [address] after d/c    Abscess of right axilla  Assessment & Plan  · Pt also has an acutely inflamed abscess of R anterior axilla  · Denies injecting into her upper extremities  · Notes recurrent b/l axillary abscesses, at least 3 within the past 1 year, which have resolved spontaneously after self-expressing drainage  · Possibly 2/2 hidradenitis suppurativa  · Continue ABX as above- Keflex and Bactrim  · OP referral to dermatology on d/c    Abscess of left lower extremity  Assessment & Plan  · Opened spontaneously  No further intervention needed  Continue wound care  · There is a associated cellulitis  Improvement exhibited today  Transitioned to PO Bactrim and Keflex  Will continue upon discharge       Tobacco use disorder  Assessment & Plan  · Pt reports smoking 0 5-1 ppd of cigarettes  · Offered and accepted NRT   · Encourage cessation      Polysubstance abuse ([address] )  Assessment & Plan  · Case management involved for aftercare planning and linkage to ongoing [facility] treatment post discharge  Peptic ulcer disease  Assessment & Plan  · Pt with a h/o PUD  · Was previously on Zantac prior to product recall, no current home medications  · Continue daily PPI    Transaminitis  Assessment & Plan  · Likely secondary to Hep C    · Will need outpatient follow up for further eval and definitive treatment  Benzodiazepine misuse  Assessment & Plan  · Pt has not developed evidence of withdrawal    · Encourage cessation       Opioid withdrawal (HCC)-resolved as of 2/19/2023  Assessment & Plan  · Tolerating maintenance Suboxone 8mg BID       Consultations During Hospital Stay:  · Case management  · Pharmacy    Procedures Performed:   · Suboxone micro-induction     Significant Findings / Test Results:   · Abscess of lower left extremity and right axilla  · Transaminitis- stable  · Hepatitis C antibody positive     Incidental Findings:   · None    Test Results Pending at Discharge (will require follow up):   · none     Outpatient Tests/follow-ups requested:  · PCP follow-up  · Dermatology follow-up  · GI follow-up    Complications:  None     Reason for Admission: Opioid withdrawal, opioid use disorder     Hospital Course:     Rubi Polanco is a 29 y o  female patient who originally presented to the hospital on 2/17/2023 due to opioid withdrawal   Patient initially presented to the [facility] ED requesting detoxification from opioids and agreed to initiation of Suboxone  Pt was subsequently admitted to the [address] [place] [address] Detox Unit for medically assisted opioid withdrawal and MAT induction with Suboxone    On admission, she was placed on Butrans patch 20 mcg to slowly introduce Suboxone and prevent precipitated withdrawal   On 2/18/23, patient underwent microinduction with transition to maintenance Suboxone 8 mg BID without complication, and Butrans patch subsequently removed  During this admission, patient was found to have an abscess of the left lower extremity  She was placed on IV vancomycin while hospitalized and was transitioned to p o  Bactrim and Keflex on discharge  Upon discharge it was determined that the [facility] she was anticipating to be discharged to does not accept individuals on Suboxone  This provider reviewed options with Recovery  and advocated for patient admission if on injectable Sublocade  Director reported she could coordinate this dependent on patients goal for treatment  Discussed this with patient and she chose to comply with [facility] and meet with affiliated providers to start Vivitrol in the next week  Case management was consulted for assistance with disposition planning, and patient will be discharged to [facility]  Please see above list of diagnoses and related plan for additional information  Condition at Discharge: good     Discharge Day Visit / Exam:     Subjective:  Patient resting in bed comfortably, denying any acute complaints  Vitals: Blood Pressure: 127/79 (02/19/23 1523)  Pulse: 96 (02/19/23 1523)  Temperature: 97 9 °F (36 6 °C) (02/19/23 1523)  Temp Source: Temporal (02/19/23 1523)  Respirations: 18 (02/19/23 1523)  Height: 5' 5" (165 1 cm) (02/17/23 0454)  Weight - Scale: 62 1 kg (137 lb) (02/17/23 0454)  SpO2: 99 % (02/19/23 1523)  Exam:   Physical Exam  Cardiovascular:      Rate and Rhythm: Normal rate  Heart sounds: Normal heart sounds  Pulmonary:      Effort: Pulmonary effort is normal       Breath sounds: Normal breath sounds  Skin:     General: Skin is warm  Comments: L thigh with open abscess quarter sized  There is no fluctuance now  Improved surrounding cellulitis of the thigh  Neurological:      Mental Status: She is alert and oriented to person, place, and time     Psychiatric: Behavior: Behavior normal        Discussion with Family: Spoke with patient regarding treatment plan    Discharge instructions/Information to patient and family:   See after visit summary for information provided to patient and family  Provisions for Follow-Up Care:  See after visit summary for information related to follow-up care and any pertinent home health orders  Disposition:     Home    For Discharges to [facility] SNF:   · Not Applicable to this Patient - Not Applicable to this Patient    Planned Readmission: None     Discharge Statement:  I spent 35 minutes discharging the patient  This time was spent on the day of discharge  I had direct contact with the patient on the day of discharge  Greater than 50% of the total time was spent examining patient, answering all patient questions, arranging and discussing plan of care with patient as well as directly providing post-discharge instructions  Additional time then spent on discharge activities  Discharge Medications:  See after visit summary for reconciled discharge medications provided to patient and family        ** Please Note: This note has been constructed using a voice recognition system **
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
## 2023-02-19 NOTE — NURSING NOTE
Discharge instructions provided to patient, all questions answered at time of d/c  No IV in place, patient ambulated off unit in stable condition

## 2023-02-19 NOTE — CASE MANAGEMENT
Director of the [place] [address] contacted NP, Luciana Hale and stated that they are willing to accept pt to their facility today  CM contacted [facility] and arranged transportation to recovery house in [address] [place] [address] Drive ([address] [facility], [address] [place] [address] Drive [address])

## 2023-02-19 NOTE — PLAN OF CARE
Problem: SUBSTANCE USE/ABUSE  Goal: By discharge, will develop insight into their chemical dependency and sustain motivation to continue in recovery  Description: INTERVENTIONS:  - Attends all daily group sessions and scheduled AA groups  - Actively practices coping skills through participation in the therapeutic community and adherence to program rules  - Reviews and completes assignments from individual treatment plan  - Assist patient development of understanding of their personal cycle of addiction and relapse triggers  2/19/2023 1645 by Mayra Bo RN  Outcome: Adequate for Discharge  2/19/2023 0757 by Mayra Bo RN  Outcome: Progressing  Goal: By discharge, patient will have ongoing treatment plan addressing chemical dependency  Description: INTERVENTIONS:  - Assist patient with resources and/or appointments for ongoing recovery based living  2/19/2023 1645 by Mayra Bo RN  Outcome: Adequate for Discharge  2/19/2023 0757 by Mayra Bo RN  Outcome: Progressing

## 2023-02-19 NOTE — CASE MANAGEMENT
CM received phone call from the [address] [place] [address] Director, Margy Castro to where pt was planning to be discharged to  Pt signed LISETH for Margy Castro, Director of the [address] [place] [address]; LISETH was filed in 8hands   Margy Castro was requesting to speak with medical provider; phone call was transferred to Dennys Mcdowell NP

## 2023-02-21 NOTE — UTILIZATION REVIEW
NOTIFICATION OF ADMISSION DISCHARGE   This is a Notification of Discharge from [facility]  Please be advised that this patient has been discharge from our facility  Below you will find the admission and discharge date and time including the patient’s disposition  UTILIZATION REVIEW CONTACT:  Nigel Hatchet, MA  Utilization   Network Utilization Review Department  Phone: [phone] x carefully listen to the prompts  All voicemails are confidential   Email: [email or web address]     ADMISSION INFORMATION  PRESENTATION DATE: 2/17/2023 12:14 AM  OBERVATION ADMISSION DATE:   INPATIENT ADMISSION DATE: 2/17/23  2:07 AM   DISCHARGE DATE: 2/19/2023  4:46 PM   DISPOSITION:Home/Self Care    IMPORTANT INFORMATION:  Send all requests for admission clinical reviews, approved or denied determinations and any other requests to dedicated fax number below belonging to the campus where the patient is receiving treatment   List of dedicated fax numbers:  [address] DENIALS (Administrative/Medical Necessity) [phone]   [address] (Maternity/NICU/Pediatrics) [phone]   [facility] [phone]   [facility] 87 [phone]   [address] [phone]   [facility] [phone]   [facility] [phone]   [address] [place] [address] [phone]   [facility]  [phone]   [facility] [phone]   [facility] [address] [facility] [phone]

## 2025-07-10 NOTE — PROGRESS NOTES
Progress Note - Manuela Lozano [birth year], 33 y o  female MRN: [id number]    Unit/Bed#: ICU 03 Encounter: [id number]    Primary Care Provider: No primary care provider on file  Date and time admitted to hospital: 10/22/2018 12:49 AM        Aspiration pneumonia [facility])   Assessment & Plan    Finding on admission CT  Coverage on Zosyn currently day #3  Continue IS and ambulation when possible  Ongoing issues with pulse oximetry in light of patient's ongoing agitation  Will try to get portable chest x-ray today  * Gastric ulcer with perforation but without obstruction, acute [facility])   Assessment & Plan    The patient is hemodynamically stable POD#2 s/p diagnostic laparoscopy and repair of perforated gastric ulcer  Will continue ICU monitoring, providing pain control, and supportive measures  Must remain NPO with NGT for next few days  High dose PPI ordered  Zosyn day #3 for peritonitis/peritoneal contamination/questionable aspiration pneumonia  Will check portable chest x-ray today and upper GI series on 10/26/2018  Will hold IV fluids for a few hours due to prevent fluid overload, resume maintenance fluids later today  Replete phosphorus and recheck in AM  Pending psychiatry consult  Subjective/Objective   Chief Complaint: Patient is sleeping  Subjective: Patient currently sleeping  Arouseable, but in light of ongoing agitation, aggression, and threats of pulling NG tube she was not disturbed for formal history taking  No other issues by nursing report  Objective:     Blood pressure 146/88, pulse 98, temperature 98 3 °F (36 8 °C), temperature source Tympanic, resp  rate (!) 31, height 5' 5" (1 651 m), weight 63 5 kg (140 lb), SpO2 90 %  ,Body mass index is 23 3 kg/m²        Intake/Output Summary (Last 24 hours) at 10/24/18 1039  Last data filed at 10/24/18 1001   Gross per 24 hour   Intake          3381 03 ml   Output             7120 ml   Net         -3738 97 ml       Invasive Devices     Peripheral Intravenous Line            Peripheral IV 10/22/18 Left Forearm 2 days    Peripheral IV 10/22/18 Right Arm 2 days          Drain            Closed/Suction Drain Left Abdomen Bulb 19 Fr  2 days    NG/OG/Enteral Tube Nasogastric 18 Fr Right nares 2 days    Urethral Catheter Latex 16 Fr  2 days          Airway            ETT  Cuffed;Oral 7 5 mm 2 days                Physical Exam   Constitutional: She appears well-developed and well-nourished  She appears lethargic  She is sleeping  No distress  HENT:   Head: Normocephalic and atraumatic  Cardiovascular: Normal rate, regular rhythm and normal heart sounds  No murmur heard  Pulmonary/Chest: Effort normal and breath sounds normal  No respiratory distress  She has no wheezes  Abdominal: Soft  Bowel sounds are normal  She exhibits no distension and no mass  There is tenderness  There is no guarding  Generalized TTP, mild to moderate  SHANA with serosanguinous discharge  Neurological: She appears lethargic  Skin: Skin is warm and dry  Capillary refill takes less than 2 seconds  She is not diaphoretic  Lab, Imaging and other studies:  I have personally reviewed pertinent lab results    , CBC:   Lab Results   Component Value Date    WBC 7 40 10/24/2018    HGB 12 5 10/24/2018    HCT 36 7 10/24/2018    MCV 96 10/24/2018     10/24/2018    MCH 32 4 10/24/2018    MCHC 34 0 10/24/2018    RDW 13 0 10/24/2018    MPV 8 6 10/24/2018    NRBC 0 10/24/2018   , CMP:   Lab Results   Component Value Date     10/24/2018    K 3 6 10/24/2018     10/24/2018    CO2 26 10/24/2018    BUN 6 (L) 10/24/2018    CREATININE 0 58 (L) 10/24/2018    CALCIUM 8 6 10/24/2018    EGFR 127 10/24/2018     VTE Pharmacologic Prophylaxis: Heparin  VTE Mechanical Prophylaxis: sequential compression device [de-identified] : 83 y/o M presents for follow up reports overall decreased hearing with no improvment denies any recent infections Denies otalgia, otorrhea, ear infections, dizziness/vertigo.

## (undated) DEVICE — TROCAR: Brand: KII FIOS FIRST ENTRY

## (undated) DEVICE — STAPLER SKIN 35 WIDE ULC APPOSE

## (undated) DEVICE — GLOVE SRG BIOGEL 7

## (undated) DEVICE — IRRIGATOR DISPOSABLE SUCTION

## (undated) DEVICE — ENDOPATH XCEL BLADELESS TROCARS WITH STABILITY SLEEVES: Brand: ENDOPATH XCEL

## (undated) DEVICE — SPONGE GAUZE 2 X 2 4PLY STRL

## (undated) DEVICE — INTENDED FOR TISSUE SEPARATION, AND OTHER PROCEDURES THAT REQUIRE A SHARP SURGICAL BLADE TO PUNCTURE OR CUT.: Brand: BARD-PARKER ® CARBON RIB-BACK BLADES

## (undated) DEVICE — SYRINGE 10ML LL

## (undated) DEVICE — ASTOUND IMPERVIOUS SURGICAL GOWN: Brand: CONVERTORS

## (undated) DEVICE — NEEDLE SPINAL 22G X 1 1/2

## (undated) DEVICE — GROUNDING PAD UNIVERSAL SLW

## (undated) DEVICE — SUT VICRYL 3-0 SH 27 IN J416H

## (undated) DEVICE — SUT VICRYL 0 REEL 54 IN J287G

## (undated) DEVICE — 3M™ TEGADERM™ TRANSPARENT FILM DRESSING FRAME STYLE, 1624W, 2-3/8 IN X 2-3/4 IN (6 CM X 7 CM), 100/CT 4CT/CASE: Brand: 3M™ TEGADERM™

## (undated) DEVICE — SUT VICRYL 0 UR-6 27 IN J603H

## (undated) DEVICE — ENDOPATH 5MM CURVED SCISSORS WITH MONOPOLAR CAUTERY: Brand: ENDOPATH

## (undated) DEVICE — TRANSPOSAL ULTRAFLEX DUO/QUAD ULTRA CART MANIFOLD

## (undated) DEVICE — 5 MM BABCOCKS WITH RATCHET HANDLES: Brand: ENDOPATH

## (undated) DEVICE — SUT ETHILON 2-0 PS 18 IN 585H

## (undated) DEVICE — SUT CHROMIC 3-0 SH 27 IN G122H

## (undated) DEVICE — DRAIN CHANNEL RND FULL FLUTED HUBLESS 19FR

## (undated) DEVICE — LEGGINGS, PAIR, 33X51 XL, STERILE: Brand: MEDLINE

## (undated) DEVICE — GARMENT,MEDLINE,DVT,INT,CALF,FOAM,MED: Brand: MEDLINE

## (undated) DEVICE — CHLORAPREP HI-LITE 26ML ORANGE

## (undated) DEVICE — LAPAROSCOPY PACK: Brand: MEDLINE INDUSTRIES, INC.

## (undated) DEVICE — ADHESIVE SKIN CLSR DERMABOND NX

## (undated) DEVICE — Device

## (undated) DEVICE — SUT SILK 3-0 SH CR/8 18 IN C013D

## (undated) DEVICE — Device: Brand: THUNDERBEAT 5 MM, 35 CM, FRONT-ACTUATED GRIP

## (undated) DEVICE — 3M™ TEGADERM™ TRANSPARENT FILM DRESSING FRAME STYLE, 1626W, 4 IN X 4-3/4 IN (10 CM X 12 CM), 50/CT 4CT/CASE: Brand: 3M™ TEGADERM™

## (undated) DEVICE — SUT SILK 3-0 SH 30 IN K832H

## (undated) DEVICE — JACKSON-PRATT 100CC BULB RESERVOIR: Brand: CARDINAL HEALTH

## (undated) DEVICE — SUT ETHILON 2-0 FS 18 IN 664H

## (undated) DEVICE — SUT MONOCRYL 4-0 PS-2 27 IN Y426H

## (undated) DEVICE — TROCAR 12MM BLUNT TIP

## (undated) DEVICE — GLOVE INDICATOR UNDERGLOVE SZ 7.5 GREEN